# Patient Record
Sex: FEMALE | Race: WHITE | Employment: OTHER | URBAN - NONMETROPOLITAN AREA
[De-identification: names, ages, dates, MRNs, and addresses within clinical notes are randomized per-mention and may not be internally consistent; named-entity substitution may affect disease eponyms.]

---

## 2020-04-22 ENCOUNTER — APPOINTMENT (OUTPATIENT)
Dept: CT IMAGING | Age: 65
DRG: 247 | End: 2020-04-22
Payer: MEDICAID

## 2020-04-22 ENCOUNTER — HOSPITAL ENCOUNTER (INPATIENT)
Age: 65
LOS: 1 days | Discharge: HOME | DRG: 247 | End: 2020-04-23
Attending: EMERGENCY MEDICINE | Admitting: INTERNAL MEDICINE
Payer: MEDICAID

## 2020-04-22 ENCOUNTER — APPOINTMENT (OUTPATIENT)
Dept: GENERAL RADIOLOGY | Age: 65
DRG: 247 | End: 2020-04-22
Payer: MEDICAID

## 2020-04-22 PROBLEM — I24.9 ACS (ACUTE CORONARY SYNDROME) (HCC): Status: ACTIVE | Noted: 2020-04-22

## 2020-04-22 LAB
ALBUMIN SERPL-MCNC: 4.1 G/DL (ref 3.5–5.2)
ALP BLD-CCNC: 85 U/L (ref 35–104)
ALT SERPL-CCNC: 19 U/L (ref 5–33)
ANION GAP SERPL CALCULATED.3IONS-SCNC: 16 MMOL/L (ref 7–19)
APTT: 23.2 SEC (ref 26–36.2)
AST SERPL-CCNC: 13 U/L (ref 5–32)
BACTERIA: NEGATIVE /HPF
BASOPHILS ABSOLUTE: 0 K/UL (ref 0–0.2)
BASOPHILS RELATIVE PERCENT: 0.6 % (ref 0–1)
BILIRUB SERPL-MCNC: 0.7 MG/DL (ref 0.2–1.2)
BILIRUBIN URINE: NEGATIVE
BLOOD, URINE: NEGATIVE
BUN BLDV-MCNC: 19 MG/DL (ref 8–23)
CALCIUM SERPL-MCNC: 9.2 MG/DL (ref 8.8–10.2)
CHLORIDE BLD-SCNC: 99 MMOL/L (ref 98–111)
CHOLESTEROL, TOTAL: 167 MG/DL (ref 160–199)
CLARITY: CLEAR
CO2: 24 MMOL/L (ref 22–29)
COLOR: YELLOW
CREAT SERPL-MCNC: 0.8 MG/DL (ref 0.5–0.9)
EKG P AXIS: 18 DEGREES
EKG P AXIS: 27 DEGREES
EKG P-R INTERVAL: 168 MS
EKG P-R INTERVAL: 174 MS
EKG Q-T INTERVAL: 384 MS
EKG Q-T INTERVAL: 422 MS
EKG QRS DURATION: 96 MS
EKG QRS DURATION: 98 MS
EKG QTC CALCULATION (BAZETT): 422 MS
EKG QTC CALCULATION (BAZETT): 450 MS
EKG T AXIS: 44 DEGREES
EKG T AXIS: 86 DEGREES
EOSINOPHILS ABSOLUTE: 0.3 K/UL (ref 0–0.6)
EOSINOPHILS RELATIVE PERCENT: 3.7 % (ref 0–5)
EPITHELIAL CELLS, UA: 1 /HPF (ref 0–5)
GFR NON-AFRICAN AMERICAN: >60
GLUCOSE BLD-MCNC: 158 MG/DL (ref 74–109)
GLUCOSE URINE: NEGATIVE MG/DL
HCT VFR BLD CALC: 40.6 % (ref 37–47)
HDLC SERPL-MCNC: 53 MG/DL (ref 65–121)
HEMOGLOBIN: 13.5 G/DL (ref 12–16)
HYALINE CASTS: 1 /HPF (ref 0–8)
IMMATURE GRANULOCYTES #: 0 K/UL
INR BLD: 0.93 (ref 0.88–1.18)
KETONES, URINE: NEGATIVE MG/DL
LDL CHOLESTEROL CALCULATED: 85 MG/DL
LEUKOCYTE ESTERASE, URINE: ABNORMAL
LIPASE: 46 U/L (ref 13–60)
LYMPHOCYTES ABSOLUTE: 2.6 K/UL (ref 1.1–4.5)
LYMPHOCYTES RELATIVE PERCENT: 39 % (ref 20–40)
MCH RBC QN AUTO: 28.4 PG (ref 27–31)
MCHC RBC AUTO-ENTMCNC: 33.3 G/DL (ref 33–37)
MCV RBC AUTO: 85.5 FL (ref 81–99)
MONOCYTES ABSOLUTE: 0.4 K/UL (ref 0–0.9)
MONOCYTES RELATIVE PERCENT: 6.3 % (ref 0–10)
NEUTROPHILS ABSOLUTE: 3.3 K/UL (ref 1.5–7.5)
NEUTROPHILS RELATIVE PERCENT: 49.8 % (ref 50–65)
NITRITE, URINE: NEGATIVE
PDW BLD-RTO: 13.7 % (ref 11.5–14.5)
PH UA: 7.5 (ref 5–8)
PLATELET # BLD: 205 K/UL (ref 130–400)
PMV BLD AUTO: 9.6 FL (ref 9.4–12.3)
POTASSIUM REFLEX MAGNESIUM: 4 MMOL/L (ref 3.5–5)
PROTEIN UA: NEGATIVE MG/DL
PROTHROMBIN TIME: 12.3 SEC (ref 12–14.6)
RBC # BLD: 4.75 M/UL (ref 4.2–5.4)
RBC UA: 1 /HPF (ref 0–4)
SODIUM BLD-SCNC: 139 MMOL/L (ref 136–145)
SPECIFIC GRAVITY UA: 1.04 (ref 1–1.03)
TOTAL PROTEIN: 7.1 G/DL (ref 6.6–8.7)
TRIGL SERPL-MCNC: 146 MG/DL (ref 0–149)
TROPONIN: 0.05 NG/ML (ref 0–0.03)
TROPONIN: 0.1 NG/ML (ref 0–0.03)
TROPONIN: 0.1 NG/ML (ref 0–0.03)
TROPONIN: <0.01 NG/ML (ref 0–0.03)
UROBILINOGEN, URINE: 1 E.U./DL
WBC # BLD: 6.7 K/UL (ref 4.8–10.8)
WBC UA: 2 /HPF (ref 0–5)

## 2020-04-22 PROCEDURE — 4A023N7 MEASUREMENT OF CARDIAC SAMPLING AND PRESSURE, LEFT HEART, PERCUTANEOUS APPROACH: ICD-10-PCS | Performed by: INTERNAL MEDICINE

## 2020-04-22 PROCEDURE — 027034Z DILATION OF CORONARY ARTERY, ONE ARTERY WITH DRUG-ELUTING INTRALUMINAL DEVICE, PERCUTANEOUS APPROACH: ICD-10-PCS | Performed by: INTERNAL MEDICINE

## 2020-04-22 PROCEDURE — 96375 TX/PRO/DX INJ NEW DRUG ADDON: CPT

## 2020-04-22 PROCEDURE — 93005 ELECTROCARDIOGRAM TRACING: CPT | Performed by: EMERGENCY MEDICINE

## 2020-04-22 PROCEDURE — 85730 THROMBOPLASTIN TIME PARTIAL: CPT

## 2020-04-22 PROCEDURE — 81001 URINALYSIS AUTO W/SCOPE: CPT

## 2020-04-22 PROCEDURE — 99223 1ST HOSP IP/OBS HIGH 75: CPT | Performed by: INTERNAL MEDICINE

## 2020-04-22 PROCEDURE — 36415 COLL VENOUS BLD VENIPUNCTURE: CPT

## 2020-04-22 PROCEDURE — 6360000004 HC RX CONTRAST MEDICATION: Performed by: EMERGENCY MEDICINE

## 2020-04-22 PROCEDURE — 2709999900 HC NON-CHARGEABLE SUPPLY

## 2020-04-22 PROCEDURE — 6370000000 HC RX 637 (ALT 250 FOR IP): Performed by: EMERGENCY MEDICINE

## 2020-04-22 PROCEDURE — 93458 L HRT ARTERY/VENTRICLE ANGIO: CPT | Performed by: INTERNAL MEDICINE

## 2020-04-22 PROCEDURE — 85025 COMPLETE CBC W/AUTO DIFF WBC: CPT

## 2020-04-22 PROCEDURE — 96374 THER/PROPH/DIAG INJ IV PUSH: CPT

## 2020-04-22 PROCEDURE — 6360000002 HC RX W HCPCS

## 2020-04-22 PROCEDURE — 99285 EMERGENCY DEPT VISIT HI MDM: CPT

## 2020-04-22 PROCEDURE — C1894 INTRO/SHEATH, NON-LASER: HCPCS

## 2020-04-22 PROCEDURE — B2151ZZ FLUOROSCOPY OF LEFT HEART USING LOW OSMOLAR CONTRAST: ICD-10-PCS | Performed by: INTERNAL MEDICINE

## 2020-04-22 PROCEDURE — 2500000003 HC RX 250 WO HCPCS

## 2020-04-22 PROCEDURE — 80061 LIPID PANEL: CPT

## 2020-04-22 PROCEDURE — 83690 ASSAY OF LIPASE: CPT

## 2020-04-22 PROCEDURE — 2580000003 HC RX 258: Performed by: INTERNAL MEDICINE

## 2020-04-22 PROCEDURE — 6370000000 HC RX 637 (ALT 250 FOR IP)

## 2020-04-22 PROCEDURE — 92928 PRQ TCAT PLMT NTRAC ST 1 LES: CPT | Performed by: INTERNAL MEDICINE

## 2020-04-22 PROCEDURE — 71045 X-RAY EXAM CHEST 1 VIEW: CPT

## 2020-04-22 PROCEDURE — 71275 CT ANGIOGRAPHY CHEST: CPT

## 2020-04-22 PROCEDURE — C1887 CATHETER, GUIDING: HCPCS

## 2020-04-22 PROCEDURE — 6370000000 HC RX 637 (ALT 250 FOR IP): Performed by: INTERNAL MEDICINE

## 2020-04-22 PROCEDURE — 80053 COMPREHEN METABOLIC PANEL: CPT

## 2020-04-22 PROCEDURE — C1769 GUIDE WIRE: HCPCS

## 2020-04-22 PROCEDURE — G0378 HOSPITAL OBSERVATION PER HR: HCPCS

## 2020-04-22 PROCEDURE — 99152 MOD SED SAME PHYS/QHP 5/>YRS: CPT | Performed by: INTERNAL MEDICINE

## 2020-04-22 PROCEDURE — C1874 STENT, COATED/COV W/DEL SYS: HCPCS

## 2020-04-22 PROCEDURE — B2111ZZ FLUOROSCOPY OF MULTIPLE CORONARY ARTERIES USING LOW OSMOLAR CONTRAST: ICD-10-PCS | Performed by: INTERNAL MEDICINE

## 2020-04-22 PROCEDURE — 93010 ELECTROCARDIOGRAM REPORT: CPT | Performed by: INTERNAL MEDICINE

## 2020-04-22 PROCEDURE — 85610 PROTHROMBIN TIME: CPT

## 2020-04-22 PROCEDURE — C1760 CLOSURE DEV, VASC: HCPCS

## 2020-04-22 PROCEDURE — 6360000002 HC RX W HCPCS: Performed by: EMERGENCY MEDICINE

## 2020-04-22 PROCEDURE — 84484 ASSAY OF TROPONIN QUANT: CPT

## 2020-04-22 PROCEDURE — 6360000002 HC RX W HCPCS: Performed by: INTERNAL MEDICINE

## 2020-04-22 PROCEDURE — 99999 PR OFFICE/OUTPT VISIT,PROCEDURE ONLY: CPT | Performed by: EMERGENCY MEDICINE

## 2020-04-22 RX ORDER — LISINOPRIL 5 MG/1
5 TABLET ORAL DAILY
Status: DISCONTINUED | OUTPATIENT
Start: 2020-04-22 | End: 2020-04-23 | Stop reason: HOSPADM

## 2020-04-22 RX ORDER — LOVASTATIN 10 MG/1
10 TABLET ORAL NIGHTLY
Status: ON HOLD | COMMUNITY
End: 2020-04-23 | Stop reason: HOSPADM

## 2020-04-22 RX ORDER — TRAMADOL HYDROCHLORIDE 50 MG/1
50 TABLET ORAL EVERY 6 HOURS PRN
Status: DISCONTINUED | OUTPATIENT
Start: 2020-04-22 | End: 2020-04-23 | Stop reason: HOSPADM

## 2020-04-22 RX ORDER — SODIUM CHLORIDE 0.9 % (FLUSH) 0.9 %
10 SYRINGE (ML) INJECTION PRN
Status: DISCONTINUED | OUTPATIENT
Start: 2020-04-22 | End: 2020-04-23 | Stop reason: HOSPADM

## 2020-04-22 RX ORDER — ALPRAZOLAM 0.5 MG/1
0.5 TABLET ORAL 3 TIMES DAILY PRN
COMMUNITY

## 2020-04-22 RX ORDER — METOPROLOL SUCCINATE 25 MG/1
25 TABLET, EXTENDED RELEASE ORAL DAILY
Status: DISCONTINUED | OUTPATIENT
Start: 2020-04-22 | End: 2020-04-23 | Stop reason: HOSPADM

## 2020-04-22 RX ORDER — MORPHINE SULFATE 4 MG/ML
2 INJECTION, SOLUTION INTRAMUSCULAR; INTRAVENOUS ONCE
Status: COMPLETED | OUTPATIENT
Start: 2020-04-22 | End: 2020-04-22

## 2020-04-22 RX ORDER — HEPARIN SODIUM 1000 [USP'U]/ML
4000 INJECTION, SOLUTION INTRAVENOUS; SUBCUTANEOUS PRN
Status: DISCONTINUED | OUTPATIENT
Start: 2020-04-22 | End: 2020-04-22

## 2020-04-22 RX ORDER — TRIAMTERENE AND HYDROCHLOROTHIAZIDE 37.5; 25 MG/1; MG/1
1 TABLET ORAL DAILY
Status: DISCONTINUED | OUTPATIENT
Start: 2020-04-22 | End: 2020-04-23 | Stop reason: HOSPADM

## 2020-04-22 RX ORDER — TRAMADOL HYDROCHLORIDE 50 MG/1
50 TABLET ORAL EVERY 6 HOURS PRN
COMMUNITY

## 2020-04-22 RX ORDER — ACETAMINOPHEN 325 MG/1
650 TABLET ORAL EVERY 4 HOURS PRN
Status: DISCONTINUED | OUTPATIENT
Start: 2020-04-22 | End: 2020-04-23 | Stop reason: HOSPADM

## 2020-04-22 RX ORDER — HEPARIN SODIUM 10000 [USP'U]/100ML
INJECTION, SOLUTION INTRAVENOUS
Status: COMPLETED
Start: 2020-04-22 | End: 2020-04-22

## 2020-04-22 RX ORDER — SODIUM CHLORIDE 9 MG/ML
INJECTION, SOLUTION INTRAVENOUS CONTINUOUS
Status: DISCONTINUED | OUTPATIENT
Start: 2020-04-22 | End: 2020-04-23 | Stop reason: HOSPADM

## 2020-04-22 RX ORDER — HEPARIN SODIUM 1000 [USP'U]/ML
2000 INJECTION, SOLUTION INTRAVENOUS; SUBCUTANEOUS PRN
Status: DISCONTINUED | OUTPATIENT
Start: 2020-04-22 | End: 2020-04-22

## 2020-04-22 RX ORDER — SODIUM CHLORIDE 0.9 % (FLUSH) 0.9 %
10 SYRINGE (ML) INJECTION EVERY 12 HOURS SCHEDULED
Status: DISCONTINUED | OUTPATIENT
Start: 2020-04-22 | End: 2020-04-23 | Stop reason: HOSPADM

## 2020-04-22 RX ORDER — ATORVASTATIN CALCIUM 80 MG/1
80 TABLET, FILM COATED ORAL NIGHTLY
Status: DISCONTINUED | OUTPATIENT
Start: 2020-04-22 | End: 2020-04-23 | Stop reason: HOSPADM

## 2020-04-22 RX ORDER — PANTOPRAZOLE SODIUM 40 MG/1
40 TABLET, DELAYED RELEASE ORAL DAILY
Status: DISCONTINUED | OUTPATIENT
Start: 2020-04-22 | End: 2020-04-23 | Stop reason: HOSPADM

## 2020-04-22 RX ORDER — LORATADINE 10 MG/1
10 TABLET ORAL DAILY
COMMUNITY

## 2020-04-22 RX ORDER — PANTOPRAZOLE SODIUM 40 MG/1
40 TABLET, DELAYED RELEASE ORAL DAILY
COMMUNITY

## 2020-04-22 RX ORDER — TRIAMTERENE AND HYDROCHLOROTHIAZIDE 37.5; 25 MG/1; MG/1
1 TABLET ORAL DAILY
COMMUNITY
End: 2020-07-14 | Stop reason: SDUPTHER

## 2020-04-22 RX ORDER — ASPIRIN 81 MG/1
81 TABLET, CHEWABLE ORAL DAILY
Status: DISCONTINUED | OUTPATIENT
Start: 2020-04-23 | End: 2020-04-23 | Stop reason: HOSPADM

## 2020-04-22 RX ORDER — ASPIRIN 81 MG/1
81 TABLET, CHEWABLE ORAL DAILY
Status: DISCONTINUED | OUTPATIENT
Start: 2020-04-22 | End: 2020-04-22

## 2020-04-22 RX ORDER — HEPARIN SODIUM 10000 [USP'U]/100ML
1000 INJECTION, SOLUTION INTRAVENOUS CONTINUOUS
Status: DISCONTINUED | OUTPATIENT
Start: 2020-04-22 | End: 2020-04-22

## 2020-04-22 RX ORDER — HEPARIN SODIUM 1000 [USP'U]/ML
4000 INJECTION, SOLUTION INTRAVENOUS; SUBCUTANEOUS ONCE
Status: DISCONTINUED | OUTPATIENT
Start: 2020-04-22 | End: 2020-04-23 | Stop reason: HOSPADM

## 2020-04-22 RX ORDER — CITALOPRAM 40 MG/1
40 TABLET ORAL NIGHTLY
COMMUNITY

## 2020-04-22 RX ORDER — ALPRAZOLAM 0.5 MG/1
0.5 TABLET ORAL 3 TIMES DAILY PRN
Status: DISCONTINUED | OUTPATIENT
Start: 2020-04-22 | End: 2020-04-23 | Stop reason: HOSPADM

## 2020-04-22 RX ORDER — ONDANSETRON 2 MG/ML
4 INJECTION INTRAMUSCULAR; INTRAVENOUS EVERY 6 HOURS PRN
Status: DISCONTINUED | OUTPATIENT
Start: 2020-04-22 | End: 2020-04-23 | Stop reason: HOSPADM

## 2020-04-22 RX ORDER — CITALOPRAM 40 MG/1
40 TABLET ORAL DAILY
Status: DISCONTINUED | OUTPATIENT
Start: 2020-04-22 | End: 2020-04-23 | Stop reason: HOSPADM

## 2020-04-22 RX ORDER — HEPARIN SODIUM 5000 [USP'U]/ML
INJECTION, SOLUTION INTRAVENOUS; SUBCUTANEOUS
Status: COMPLETED
Start: 2020-04-22 | End: 2020-04-22

## 2020-04-22 RX ORDER — PRASUGREL 10 MG/1
10 TABLET, FILM COATED ORAL DAILY
Status: DISCONTINUED | OUTPATIENT
Start: 2020-04-23 | End: 2020-04-23 | Stop reason: HOSPADM

## 2020-04-22 RX ORDER — HEPARIN SODIUM 10000 [USP'U]/100ML
11.6 INJECTION, SOLUTION INTRAVENOUS CONTINUOUS
Status: DISCONTINUED | OUTPATIENT
Start: 2020-04-22 | End: 2020-04-22

## 2020-04-22 RX ORDER — ONDANSETRON 2 MG/ML
4 INJECTION INTRAMUSCULAR; INTRAVENOUS ONCE
Status: COMPLETED | OUTPATIENT
Start: 2020-04-22 | End: 2020-04-22

## 2020-04-22 RX ADMIN — LISINOPRIL 5 MG: 5 TABLET ORAL at 16:45

## 2020-04-22 RX ADMIN — IOPAMIDOL 95 ML: 755 INJECTION, SOLUTION INTRAVENOUS at 10:25

## 2020-04-22 RX ADMIN — ONDANSETRON 4 MG: 2 INJECTION INTRAMUSCULAR; INTRAVENOUS at 09:02

## 2020-04-22 RX ADMIN — SODIUM CHLORIDE, PRESERVATIVE FREE 10 ML: 5 INJECTION INTRAVENOUS at 21:36

## 2020-04-22 RX ADMIN — HEPARIN SODIUM AND DEXTROSE 11.6 UNITS/KG/HR: 10000; 5 INJECTION INTRAVENOUS at 12:51

## 2020-04-22 RX ADMIN — SODIUM CHLORIDE: 9 INJECTION, SOLUTION INTRAVENOUS at 15:07

## 2020-04-22 RX ADMIN — HEPARIN SODIUM 4000 UNITS: 5000 INJECTION INTRAVENOUS; SUBCUTANEOUS at 12:32

## 2020-04-22 RX ADMIN — LIDOCAINE HYDROCHLORIDE: 20 SOLUTION ORAL; TOPICAL at 09:56

## 2020-04-22 RX ADMIN — HEPARIN SODIUM 25000 UNITS: 10000 INJECTION, SOLUTION INTRAVENOUS at 12:45

## 2020-04-22 RX ADMIN — METOPROLOL SUCCINATE 25 MG: 25 TABLET, EXTENDED RELEASE ORAL at 16:45

## 2020-04-22 RX ADMIN — BIVALIRUDIN 1 MG/KG/HR: 250 INJECTION, POWDER, LYOPHILIZED, FOR SOLUTION INTRAVENOUS at 14:37

## 2020-04-22 RX ADMIN — MORPHINE SULFATE 2 MG: 4 INJECTION INTRAVENOUS at 09:02

## 2020-04-22 RX ADMIN — PANTOPRAZOLE SODIUM 40 MG: 40 TABLET, DELAYED RELEASE ORAL at 21:36

## 2020-04-22 RX ADMIN — SODIUM CHLORIDE: 9 INJECTION, SOLUTION INTRAVENOUS at 21:37

## 2020-04-22 RX ADMIN — IOPAMIDOL 161 ML: 612 INJECTION, SOLUTION INTRAVENOUS at 14:13

## 2020-04-22 RX ADMIN — CITALOPRAM HYDROBROMIDE 40 MG: 40 TABLET ORAL at 21:35

## 2020-04-22 RX ADMIN — ATORVASTATIN CALCIUM 80 MG: 80 TABLET, FILM COATED ORAL at 21:35

## 2020-04-22 ASSESSMENT — ENCOUNTER SYMPTOMS
SHORTNESS OF BREATH: 0
RHINORRHEA: 0
ABDOMINAL PAIN: 0
EYE PAIN: 0
VOMITING: 0
VOICE CHANGE: 0
COUGH: 0
DIARRHEA: 0
EYE REDNESS: 0

## 2020-04-22 ASSESSMENT — PAIN SCALES - GENERAL
PAINLEVEL_OUTOF10: 5
PAINLEVEL_OUTOF10: 6
PAINLEVEL_OUTOF10: 0
PAINLEVEL_OUTOF10: 6
PAINLEVEL_OUTOF10: 4
PAINLEVEL_OUTOF10: 0

## 2020-04-22 ASSESSMENT — PAIN DESCRIPTION - PAIN TYPE
TYPE: ACUTE PAIN
TYPE: ACUTE PAIN

## 2020-04-22 ASSESSMENT — PAIN DESCRIPTION - LOCATION
LOCATION: CHEST
LOCATION: CHEST

## 2020-04-22 ASSESSMENT — HEART SCORE: ECG: 1

## 2020-04-22 NOTE — H&P
TUBAL LIGATION           Home Medications:   Prior to Admission medications    Medication Sig Start Date End Date Taking? Authorizing Provider   citalopram (CELEXA) 40 MG tablet Take 40 mg by mouth daily   Yes Historical Provider, MD   aspirin 81 MG tablet Take 81 mg by mouth daily   Yes Historical Provider, MD   pantoprazole (PROTONIX) 40 MG tablet Take 40 mg by mouth daily   Yes Historical Provider, MD   triamterene-hydrochlorothiazide (MAXZIDE-25) 37.5-25 MG per tablet Take 1 tablet by mouth daily   Yes Historical Provider, MD   ALPRAZolam (XANAX) 0.5 MG tablet Take 0.5 mg by mouth 3 times daily as needed for Sleep. Yes Historical Provider, MD   traMADol (ULTRAM) 50 MG tablet Take 50 mg by mouth every 6 hours as needed for Pain. Yes Historical Provider, MD        Facility Administered Medications:    heparin (porcine)  4,000 Units Intravenous Once       Allergies:  Codeine     Social History:       Social History     Socioeconomic History    Marital status:      Spouse name: Not on file    Number of children: Not on file    Years of education: Not on file    Highest education level: Not on file   Occupational History    Not on file   Social Needs    Financial resource strain: Not on file    Food insecurity     Worry: Not on file     Inability: Not on file    Transportation needs     Medical: Not on file     Non-medical: Not on file   Tobacco Use    Smoking status: Former Smoker    Smokeless tobacco: Never Used    Tobacco comment: quit in the 80's   Substance and Sexual Activity    Alcohol use: Yes     Comment: occ.  1-2 beers last night    Drug use: Never    Sexual activity: Not on file   Lifestyle    Physical activity     Days per week: Not on file     Minutes per session: Not on file    Stress: Not on file   Relationships    Social connections     Talks on phone: Not on file     Gets together: Not on file     Attends Islam service: Not on file     Active member of club or organization: Not on file     Attends meetings of clubs or organizations: Not on file     Relationship status: Not on file    Intimate partner violence     Fear of current or ex partner: Not on file     Emotionally abused: Not on file     Physically abused: Not on file     Forced sexual activity: Not on file   Other Topics Concern    Not on file   Social History Narrative    Not on file       Family History:   History reviewed. No pertinent family history. REVIEW OF SYSTEMS:     Except as noted in the HPI, all other systems are negative        PHYSICAL EXAMINATION:     /83   Pulse 80   Temp 97.7 °F (36.5 °C)   Resp 11   Ht 5' 3\" (1.6 m)   Wt 190 lb (86.2 kg)   SpO2 97%   BMI 33.66 kg/m²     GENERAL - well developed and well nourished, in mild amount of generalized distress; is an active participant in this examination  HEENT -  PERRLA, Hearing appears normal, conjunctiva and lids are normal, ears and nose appear normal  NECK - no thyromegaly, no JVD, trachea is in the midline  CARDIOVASCULAR - PMI is in the left mid line clavicular position, Normal S1 and S2 with a grade 1/6 systolic murmur. No S3 or S4    PULMONARY - No respiratory distress. scattered wheezes and rales.   Breath sounds in both  lung fields are Normal  ABDOMEN  - soft, non tender, no rebound, no hepatomegaly or splenomegaly  MUSCULOSKELETAL  - Prone/Supine, digitals and nails are without clubbing or cyanosis  EXTREMITIES - trace edema  NEUROLOGIC - cranial nerves, II-XII, are normal  SKIN - turgor is normal, no rash  PSYCHIATRIC - was not normal mood and affect, alert and orientated x 3, judgement and insight appear appropriate      LABORATORY EVALUATION & TESTING:    I have personally reviewed and interpreted the results of the following diagnostic testing      EKG and or Telemetry:  which was personally reviewed me:  Sinus rhythm,   Pulse Readings from Last 1 Encounters:   04/22/20 80    bpm,  with Acute changes of reversible

## 2020-04-22 NOTE — ED NOTES
Bed: 02-A  Expected date:   Expected time:   Means of arrival:   Comments:  EMS      Cecilio Beltran RN  04/22/20 1120

## 2020-04-22 NOTE — ED NOTES
Pain started 10 mins prior to calling EMS; EMS administered total of 100 mcg of fentanyl IVP, SL nitro x1, and 324 mg ASA.       Lor Rodas RN  04/22/20 [id number]

## 2020-04-22 NOTE — ED NOTES
Dr. Ledy Cruz at bedside     [facility], [address] [place] [address]  04/22/20 9190

## 2020-04-23 VITALS
RESPIRATION RATE: 11 BRPM | OXYGEN SATURATION: 95 % | WEIGHT: 198.5 LBS | TEMPERATURE: 96.9 F | BODY MASS INDEX: 35.17 KG/M2 | HEART RATE: 69 BPM | SYSTOLIC BLOOD PRESSURE: 132 MMHG | HEIGHT: 63 IN | DIASTOLIC BLOOD PRESSURE: 93 MMHG

## 2020-04-23 LAB
EKG P AXIS: 24 DEGREES
EKG P-R INTERVAL: 192 MS
EKG Q-T INTERVAL: 468 MS
EKG QRS DURATION: 102 MS
EKG QTC CALCULATION (BAZETT): 482 MS
EKG T AXIS: 107 DEGREES
HCT VFR BLD CALC: 39.8 % (ref 37–47)
HEMOGLOBIN: 12.6 G/DL (ref 12–16)
MCH RBC QN AUTO: 27.9 PG (ref 27–31)
MCHC RBC AUTO-ENTMCNC: 31.7 G/DL (ref 33–37)
MCV RBC AUTO: 88.1 FL (ref 81–99)
PDW BLD-RTO: 14.1 % (ref 11.5–14.5)
PLATELET # BLD: 189 K/UL (ref 130–400)
PMV BLD AUTO: 9.8 FL (ref 9.4–12.3)
RBC # BLD: 4.52 M/UL (ref 4.2–5.4)
TROPONIN: 0.05 NG/ML (ref 0–0.03)
TROPONIN: 0.07 NG/ML (ref 0–0.03)
WBC # BLD: 7.8 K/UL (ref 4.8–10.8)

## 2020-04-23 PROCEDURE — 84484 ASSAY OF TROPONIN QUANT: CPT

## 2020-04-23 PROCEDURE — 85027 COMPLETE CBC AUTOMATED: CPT

## 2020-04-23 PROCEDURE — 99238 HOSP IP/OBS DSCHRG MGMT 30/<: CPT | Performed by: INTERNAL MEDICINE

## 2020-04-23 PROCEDURE — 2000000000 HC ICU R&B

## 2020-04-23 PROCEDURE — 6370000000 HC RX 637 (ALT 250 FOR IP): Performed by: INTERNAL MEDICINE

## 2020-04-23 PROCEDURE — 93005 ELECTROCARDIOGRAM TRACING: CPT | Performed by: INTERNAL MEDICINE

## 2020-04-23 PROCEDURE — 36415 COLL VENOUS BLD VENIPUNCTURE: CPT

## 2020-04-23 PROCEDURE — 2580000003 HC RX 258: Performed by: INTERNAL MEDICINE

## 2020-04-23 RX ORDER — ATORVASTATIN CALCIUM 80 MG/1
80 TABLET, FILM COATED ORAL NIGHTLY
Qty: 30 TABLET | Refills: 3 | Status: SHIPPED | OUTPATIENT
Start: 2020-04-23 | End: 2020-07-14 | Stop reason: SDUPTHER

## 2020-04-23 RX ORDER — METOPROLOL SUCCINATE 25 MG/1
25 TABLET, EXTENDED RELEASE ORAL DAILY
Qty: 30 TABLET | Refills: 3 | Status: SHIPPED | OUTPATIENT
Start: 2020-04-24 | End: 2020-05-20

## 2020-04-23 RX ORDER — LISINOPRIL 5 MG/1
5 TABLET ORAL DAILY
Qty: 30 TABLET | Refills: 3 | Status: SHIPPED | OUTPATIENT
Start: 2020-04-24 | End: 2020-05-20

## 2020-04-23 RX ORDER — CLOPIDOGREL BISULFATE 75 MG/1
75 TABLET ORAL DAILY
Qty: 30 TABLET | Refills: 3 | Status: SHIPPED | OUTPATIENT
Start: 2020-04-23 | End: 2020-07-14 | Stop reason: SDUPTHER

## 2020-04-23 RX ADMIN — ASPIRIN 81 MG 81 MG: 81 TABLET ORAL at 08:48

## 2020-04-23 RX ADMIN — METOPROLOL SUCCINATE 25 MG: 25 TABLET, EXTENDED RELEASE ORAL at 08:48

## 2020-04-23 RX ADMIN — TRIAMTERENE AND HYDROCHLOROTHIAZIDE 1 TABLET: 37.5; 25 TABLET ORAL at 08:48

## 2020-04-23 RX ADMIN — LISINOPRIL 5 MG: 5 TABLET ORAL at 08:48

## 2020-04-23 RX ADMIN — PANTOPRAZOLE SODIUM 40 MG: 40 TABLET, DELAYED RELEASE ORAL at 08:48

## 2020-04-23 RX ADMIN — SODIUM CHLORIDE: 9 INJECTION, SOLUTION INTRAVENOUS at 04:41

## 2020-04-23 RX ADMIN — SODIUM CHLORIDE, PRESERVATIVE FREE 10 ML: 5 INJECTION INTRAVENOUS at 08:49

## 2020-04-23 RX ADMIN — PRASUGREL 10 MG: 10 TABLET, FILM COATED ORAL at 08:48

## 2020-04-23 ASSESSMENT — PAIN SCALES - GENERAL
PAINLEVEL_OUTOF10: 0

## 2020-04-23 NOTE — DISCHARGE SUMMARY
[facility]    Discharge Summary          I personally saw the patient and rounded with:  Karen Washington, on  4/23/20      The observations documented in this note, including the assessment and plan are mine              Patient ID: Joann Lawson      Patient's PCP: No primary care provider on file. Admit Date: 4/22/2020     Discharge Date:  04/23/20     Admitting Physician:  Dee Lopez MD       Discharge Physician: Dee Lopez MD     Discharge Diagnoses:    1. Coronary artery disease    4/22/20 ACS RACHEL RISK Score 3 (angina, + troponin, ASA ), AUC indication 3, AUC score 8   4/22/20  Cath  95% mid LAD, 4.0 x 28 mm KISHOR, anterior lateral hypokinesis, EF 45%    2. Systemic arterial hypertension  3. Prior cigarette use      Cardiac Specific Diagnoses:    Specialty Problems        Cardiology Problems    ACS (acute coronary syndrome) [facility])                The patient was seen and examined on day of discharge and this discharge summary is in conjunction with any daily progress note from day of discharge. History of Present Illness:     Joann Lawson is a 62 y.o. female who presents to [facility] ED # 2 (admitted on 4/22/2020  8:48 AM) with symptoms / signs / problem or diagnosis of chest discomfort.      She is normal mood and affect, alert and orientated x 3, judgement and insight appear appropriate.         Family present:  No    At the beginning of the interview she was lying on a stretcher        CONTEXT OF THIS HISTORY OF PRESENT ILLNESS INCLUDE: (IF APPLICABLE):     Presentation:  She  presented with chest discomfort. The discomfort began about 8 am this am when she had just taken her dogs out (large and small mixed breeds). The discomfort progressed, was pressure like, associated with nauseated, SOA, and radiated to the left arm. She called EMS and was brought to the ED. She is pain free now.   Cardiology was asked to see her regarding these symptoms and findings and to consider those in relationship to possible optimal diagnosis and treatment options.        FEATURES OF THIS HISTORY OF PRESENT ILLNESS (SYMPTOMS AND FINDINGS) INCLUDE:         1. Location: The chest discomfort was located in the central to left chest with radiation to the back, throat and left shoulder. 2. Duration: The discomfort began about 8 am and lasted about an hour    3. Quality: The chest discomfort was not described as pressure, fullness and tightness. It was not crushing. 4. Severity: It was described as moderate but progessive   5. Timing The symptoms began after she took her dogs out. 6. Modifying Factors: Modifying features included:   none   7. Associated Signs and Symptoms: There was associated manifestations such as nausea, diaphoresis, and shortness of air. 8. Context: As noted above in the context of the presentation.     It  was quite suggestive of myocardial ischemia.         When being examined this morning, in ED, # 2 with Doni RN Nurse Navigator & Sophia   RN, there was not on going or continuous symptoms of exertional chest discomfort, unusual or change in shortness of air, presyncope or syncope. Hospital Course:     After admission, the patient underwent cardiac catheterization according to the following criteria:  4/22/20 ACS RACHEL RISK Score 3 (angina, + troponin, ASA ), AUC indication 3, AUC score 8 , Diagnostic Catheterization Appropriate Use Criteria Description, [facility] 2012;[id number]. Selective left heart and coronary arteriography was preformed, which revealed:    1. Successful femoral artery ultrasound  2. Successful femoral artery arterogram  3. Supervision of the administration of moderate conscious sedation  4.  single vessel coronary artery disease involving the mid LAD  5. Left ventricular function is impaired in the anterior lateral segment with a visually estimate ejection of 45%. 6.  95% lesion in the mid LAD  7.   Successful percutaneous coronary intervention utilizing a single drug eluding stent to the mid LAD. 8.  Systemic arterial hypertension (systolic blood pressure > 140 mmHg)  9. Elevated left ventricular end diastolic pressure (LVEDP > 15 mmHg). There were no apparent immediate complications. After the PCI, the patient's troponin peaked at 0.07 which was decreasing at the time of discharge. The rest of the patient's hospitalization was uneventful. She was discharged home on medical therapy with outpatient follow up. Consults:     IP CONSULT TO CARDIOLOGY  IP CONSULT TO CARDIAC REHAB      Significant Diagnostic Studies:    1. Selective left heart and coronary arteriography (femoral approach), 4/22/2020       Significant Therapeutic Endeavors:      1. Percutaneous coronary intervention to the mid LAD, 4/22/2020      Activity: activity as directed per discharge instructions. Diet:  Cardiac diet    Labs:  For convenience and continuity at follow-up the following most recent labs are provided:    CBC:    Lab Results   Component Value Date    WBC 7.8 04/23/2020    HGB 12.6 04/23/2020    HCT 39.8 04/23/2020     04/23/2020       Renal:    Lab Results   Component Value Date     04/22/2020    K 4.0 04/22/2020    CL 99 04/22/2020    CO2 24 04/22/2020    BUN 19 04/22/2020    CREATININE 0.8 04/22/2020    CALCIUM 9.2 04/22/2020         Discharge Medications:     Current Discharge Medication List           Details   atorvastatin (LIPITOR) 80 MG tablet Take 1 tablet by mouth nightly  Qty: 30 tablet, Refills: 3      lisinopril (PRINIVIL;ZESTRIL) 5 MG tablet Take 1 tablet by mouth daily  Qty: 30 tablet, Refills: 3      metoprolol succinate (TOPROL XL) 25 MG extended release tablet Take 1 tablet by mouth daily  Qty: 30 tablet, Refills: 3      clopidogrel (PLAVIX) 75 MG tablet Take 1 tablet by mouth daily  Qty: 30 tablet, Refills: 3              Details   citalopram (CELEXA) 40 MG tablet Take 40 mg by mouth daily

## 2020-05-20 ENCOUNTER — TELEPHONE (OUTPATIENT)
Dept: CARDIOLOGY | Age: 65
End: 2020-05-20

## 2020-05-20 ENCOUNTER — OFFICE VISIT (OUTPATIENT)
Dept: CARDIOLOGY | Age: 65
End: 2020-05-20
Payer: MEDICAID

## 2020-05-20 VITALS
BODY MASS INDEX: 33.13 KG/M2 | SYSTOLIC BLOOD PRESSURE: 124 MMHG | DIASTOLIC BLOOD PRESSURE: 70 MMHG | HEART RATE: 66 BPM | HEIGHT: 63 IN | WEIGHT: 187 LBS

## 2020-05-20 PROBLEM — R53.83 FATIGUE: Status: ACTIVE | Noted: 2020-05-20

## 2020-05-20 PROBLEM — I25.10 CORONARY ARTERY DISEASE INVOLVING NATIVE CORONARY ARTERY OF NATIVE HEART WITHOUT ANGINA PECTORIS: Status: ACTIVE | Noted: 2020-05-20

## 2020-05-20 PROBLEM — I10 ESSENTIAL HYPERTENSION: Status: ACTIVE | Noted: 2020-05-20

## 2020-05-20 PROBLEM — Z95.5 HISTORY OF CORONARY ARTERY STENT PLACEMENT: Status: ACTIVE | Noted: 2020-05-20

## 2020-05-20 PROBLEM — I25.2 HISTORY OF MYOCARDIAL INFARCTION: Status: ACTIVE | Noted: 2020-05-20

## 2020-05-20 PROBLEM — E78.2 MIXED HYPERLIPIDEMIA: Status: ACTIVE | Noted: 2020-05-20

## 2020-05-20 PROCEDURE — G8427 DOCREV CUR MEDS BY ELIG CLIN: HCPCS | Performed by: NURSE PRACTITIONER

## 2020-05-20 PROCEDURE — 1111F DSCHRG MED/CURRENT MED MERGE: CPT | Performed by: NURSE PRACTITIONER

## 2020-05-20 PROCEDURE — 3017F COLORECTAL CA SCREEN DOC REV: CPT | Performed by: NURSE PRACTITIONER

## 2020-05-20 PROCEDURE — G8417 CALC BMI ABV UP PARAM F/U: HCPCS | Performed by: NURSE PRACTITIONER

## 2020-05-20 PROCEDURE — 1036F TOBACCO NON-USER: CPT | Performed by: NURSE PRACTITIONER

## 2020-05-20 PROCEDURE — 99214 OFFICE O/P EST MOD 30 MIN: CPT | Performed by: NURSE PRACTITIONER

## 2020-05-20 RX ORDER — METOPROLOL SUCCINATE 25 MG/1
25 TABLET, EXTENDED RELEASE ORAL NIGHTLY
Qty: 30 TABLET | Refills: 3
Start: 2020-05-20 | End: 2020-10-09 | Stop reason: ALTCHOICE

## 2020-05-20 RX ORDER — NITROGLYCERIN 0.4 MG/1
0.4 TABLET SUBLINGUAL EVERY 5 MIN PRN
Qty: 25 TABLET | Refills: 3 | Status: SHIPPED | OUTPATIENT
Start: 2020-05-20

## 2020-05-20 RX ORDER — LISINOPRIL 5 MG/1
2.5 TABLET ORAL DAILY
Qty: 30 TABLET | Refills: 0
Start: 2020-05-20 | End: 2020-10-09 | Stop reason: ALTCHOICE

## 2020-05-20 NOTE — PATIENT INSTRUCTIONS
Information box to learn more about \"Learning About Coronary Artery Disease (CAD). \"     If you do not have an account, please click on the \"Sign Up Now\" link. Current as of: December 15, 2019Content Version: 12.4  © 7306-4171 [employer]. Care instructions adapted under license by [facility] ([facility]). If you have questions about a medical condition or this instruction, always ask your healthcare professional. [address] any warranty or liability for your use of this information. Patient Education        A Healthy Heart: Care Instructions  Your Care Instructions    Heart disease occurs when a substance called plaque builds up in the vessels that supply oxygen-rich blood to your heart. This can narrow the blood vessels and reduce blood flow. A heart attack happens when blood flow is completely blocked. A high-fat diet, smoking, and other factors increase the risk of heart disease. Your doctor has found that you have a chance of having heart disease. You can do lots of things to keep your heart healthy. It may not be easy, but you can change your diet, exercise more, and quit smoking. These steps really work to lower your chance of heart disease. Follow-up care is a key part of your treatment and safety. Be sure to make and go to all appointments, and call your doctor if you are having problems. It's also a good idea to know your test results and keep a list of the medicines you take. How can you care for yourself at home? Diet    · Use less salt when you cook and eat. This helps lower your blood pressure. Taste food before salting. Add only a little salt when you think you need it. With time, your taste buds will adjust to less salt.     · Eat fewer snack items, fast foods, canned soups, and other high-salt, high-fat, processed foods.     · Read food labels and try to avoid saturated and trans fats.  They increase your risk of heart disease by raising cholesterol levels.     · Limit the amount of solid fat-butter, margarine, and shortening-you eat. Use olive, peanut, or canola oil when you cook. Bake, broil, and steam foods instead of frying them.     · Eat a variety of fruit and vegetables every day. Dark green, deep orange, red, or yellow fruits and vegetables are especially good for you. Examples include spinach, carrots, peaches, and berries.     · Foods high in fiber can reduce your cholesterol and provide important vitamins and minerals. High-fiber foods include whole-grain cereals and breads, oatmeal, beans, brown rice, citrus fruits, and apples.     · Eat lean proteins. Heart-healthy proteins include seafood, lean meats and poultry, eggs, beans, peas, nuts, seeds, and soy products.     · Limit drinks and foods with added sugar. These include candy, desserts, and soda pop.    Lifestyle changes    · If your doctor recommends it, get more exercise. Walking is a good choice. Bit by bit, increase the amount you walk every day. Try for at least 30 minutes on most days of the week. You also may want to swim, bike, or do other activities.     · Do not smoke. If you need help quitting, talk to your doctor about stop-smoking programs and medicines. These can increase your chances of quitting for good. Quitting smoking may be the most important step you can take to protect your heart. It is never too late to quit. You will get health benefits right away.     · Limit alcohol to 2 drinks a day for men and 1 drink a day for women. Too much alcohol can cause health problems. Medicines    · Take your medicines exactly as prescribed. Call your doctor if you think you are having a problem with your medicine.     · If your doctor recommends aspirin, take the amount directed each day. Make sure you take aspirin and not another kind of pain reliever, such as acetaminophen (Tylenol).  If you take ibuprofen (such as Advil or Motrin) for other problems, take aspirin at least 2 hours before taking

## 2020-05-20 NOTE — PROGRESS NOTES
artery disease involving native coronary artery of native heart without angina pectoris I25.10     Past Medical History:   Diagnosis Date    Anxiety     CAD (coronary artery disease)     Depression     GERD (gastroesophageal reflux disease)     Hypertension     Psoriasis     PUD (peptic ulcer disease)      Past Surgical History:   Procedure Laterality Date    CARDIAC CATHETERIZATION      90's    CHOLECYSTECTOMY, OPEN      CORONARY ANGIOPLASTY WITH STENT PLACEMENT      TUBAL LIGATION       Family History   Problem Relation Age of Onset    Cancer Father         lung    Cancer Brother         oral     Social History     Tobacco Use    Smoking status: Former Smoker    Smokeless tobacco: Never Used    Tobacco comment: quit in the 80's   Substance Use Topics    Alcohol use: Yes     Comment: occ. 1-2 beers last night      Current Outpatient Medications   Medication Sig Dispense Refill    atorvastatin (LIPITOR) 80 MG tablet Take 1 tablet by mouth nightly 30 tablet 3    lisinopril (PRINIVIL;ZESTRIL) 5 MG tablet Take 1 tablet by mouth daily 30 tablet 3    metoprolol succinate (TOPROL XL) 25 MG extended release tablet Take 1 tablet by mouth daily 30 tablet 3    clopidogrel (PLAVIX) 75 MG tablet Take 1 tablet by mouth daily 30 tablet 3    citalopram (CELEXA) 40 MG tablet Take 40 mg by mouth nightly       aspirin 81 MG tablet Take 81 mg by mouth daily      pantoprazole (PROTONIX) 40 MG tablet Take 40 mg by mouth daily      triamterene-hydrochlorothiazide (MAXZIDE-25) 37.5-25 MG per tablet Take 1 tablet by mouth daily      ALPRAZolam (XANAX) 0.5 MG tablet Take 0.5 mg by mouth 3 times daily as needed for Sleep.  traMADol (ULTRAM) 50 MG tablet Take 50 mg by mouth every 6 hours as needed for Pain.  loratadine (CLARITIN) 10 MG tablet Take 10 mg by mouth daily       No current facility-administered medications for this visit.         Allergies: Codeine    Review of Systems  Constitutional - no

## 2020-07-14 ENCOUNTER — OFFICE VISIT (OUTPATIENT)
Dept: CARDIOLOGY | Age: 65
End: 2020-07-14
Payer: MEDICAID

## 2020-07-14 VITALS
BODY MASS INDEX: 33.66 KG/M2 | HEIGHT: 63 IN | HEART RATE: 84 BPM | WEIGHT: 190 LBS | SYSTOLIC BLOOD PRESSURE: 118 MMHG | DIASTOLIC BLOOD PRESSURE: 80 MMHG

## 2020-07-14 PROCEDURE — G8427 DOCREV CUR MEDS BY ELIG CLIN: HCPCS | Performed by: INTERNAL MEDICINE

## 2020-07-14 PROCEDURE — 3017F COLORECTAL CA SCREEN DOC REV: CPT | Performed by: INTERNAL MEDICINE

## 2020-07-14 PROCEDURE — G8417 CALC BMI ABV UP PARAM F/U: HCPCS | Performed by: INTERNAL MEDICINE

## 2020-07-14 PROCEDURE — 1036F TOBACCO NON-USER: CPT | Performed by: INTERNAL MEDICINE

## 2020-07-14 PROCEDURE — 99213 OFFICE O/P EST LOW 20 MIN: CPT | Performed by: INTERNAL MEDICINE

## 2020-07-14 RX ORDER — TRIAMTERENE AND HYDROCHLOROTHIAZIDE 37.5; 25 MG/1; MG/1
1 TABLET ORAL DAILY
Qty: 30 TABLET | Refills: 6 | Status: SHIPPED | OUTPATIENT
Start: 2020-07-14 | End: 2020-10-09 | Stop reason: ALTCHOICE

## 2020-07-14 RX ORDER — ATORVASTATIN CALCIUM 80 MG/1
80 TABLET, FILM COATED ORAL NIGHTLY
Qty: 30 TABLET | Refills: 6 | Status: SHIPPED | OUTPATIENT
Start: 2020-07-14 | End: 2021-03-04 | Stop reason: SDUPTHER

## 2020-07-14 RX ORDER — LISINOPRIL 5 MG/1
2.5 TABLET ORAL DAILY
Qty: 30 TABLET | Refills: 6 | Status: CANCELLED | OUTPATIENT
Start: 2020-07-14

## 2020-07-14 RX ORDER — LISINOPRIL 2.5 MG/1
1.25 TABLET ORAL DAILY
Qty: 90 TABLET | Refills: 5 | Status: SHIPPED | OUTPATIENT
Start: 2020-07-14 | End: 2021-03-04 | Stop reason: SDUPTHER

## 2020-07-14 RX ORDER — LISINOPRIL 2.5 MG/1
1.25 TABLET ORAL DAILY
COMMUNITY
End: 2020-07-14 | Stop reason: SDUPTHER

## 2020-07-14 RX ORDER — CLOPIDOGREL BISULFATE 75 MG/1
75 TABLET ORAL DAILY
Qty: 30 TABLET | Refills: 6 | Status: SHIPPED | OUTPATIENT
Start: 2020-07-14 | End: 2021-03-04 | Stop reason: SDUPTHER

## 2020-07-14 RX ORDER — METOPROLOL SUCCINATE 25 MG/1
12.5 TABLET, EXTENDED RELEASE ORAL 2 TIMES DAILY
COMMUNITY
End: 2020-07-14 | Stop reason: SDUPTHER

## 2020-07-14 RX ORDER — METOPROLOL SUCCINATE 25 MG/1
25 TABLET, EXTENDED RELEASE ORAL NIGHTLY
Qty: 30 TABLET | Refills: 6 | Status: CANCELLED | OUTPATIENT
Start: 2020-07-14

## 2020-07-14 RX ORDER — METOPROLOL SUCCINATE 25 MG/1
12.5 TABLET, EXTENDED RELEASE ORAL DAILY
Qty: 30 TABLET | Refills: 5 | Status: SHIPPED | OUTPATIENT
Start: 2020-07-14 | End: 2021-03-04 | Stop reason: SDUPTHER

## 2020-07-14 NOTE — PROGRESS NOTES
[facility] Patient Office Visit    [id number]  Phone: [phone]  Fax: [phone]        7/14/2020    Chief Complaint / Reason for the Visit   Follow up of:  CAD and HTN and Hyperlipidemia      Specialty Problems        Cardiology Problems    ACS (acute coronary syndrome) [facility])        Coronary artery disease involving native coronary artery of native heart without angina pectoris        Essential hypertension        Mixed hyperlipidemia              Current Status Today According to the patient:  \"I get tired and wore out trying to do anything\"    Subjective:  Ms. Aurora Valle is generally feeling stable. Ms. Aurora Valle has the following cardiac complaints / symptoms today:    1. CAD, Is stable on current medications     2. HTN, Is stable on current medications     3.  Hyperlipidemia, managed by Dr. Kamryn Desai is a 62 y.o. female [facility] the [facility]:    Patient Active Problem List    Diagnosis Date Noted    History of myocardial infarction 05/20/2020    History of coronary artery stent placement 05/20/2020    Mixed hyperlipidemia 05/20/2020    Essential hypertension 05/20/2020    Coronary artery disease involving native coronary artery of native heart without angina pectoris 05/20/2020    Fatigue 05/20/2020    ACS (acute coronary syndrome) ([facility] [address] 04/22/2020     Current Outpatient Medications   Medication Sig Dispense Refill    lisinopril (PRINIVIL;ZESTRIL) 5 MG tablet Take 0.5 tablets by mouth daily 30 tablet 0    metoprolol succinate (TOPROL XL) 25 MG extended release tablet Take 1 tablet by mouth nightly 30 tablet 3    nitroGLYCERIN (NITROSTAT) 0.4 MG SL tablet Place 1 tablet under the tongue every 5 minutes as needed for Chest pain 25 tablet 3    atorvastatin (LIPITOR) 80 MG tablet Take 1 tablet by mouth nightly 30 tablet 3    clopidogrel (PLAVIX) 75 MG tablet Take 1 tablet by mouth daily 30 tablet 3    citalopram (CELEXA) 40 MG tablet Take 40 mg by mouth nightly       aspirin 81 MG tablet Take 81 mg by mouth daily      pantoprazole (PROTONIX) 40 MG tablet Take 40 mg by mouth daily      triamterene-hydrochlorothiazide (MAXZIDE-25) 37.5-25 MG per tablet Take 1 tablet by mouth daily      ALPRAZolam (XANAX) 0.5 MG tablet Take 0.5 mg by mouth 3 times daily as needed for Sleep.  traMADol (ULTRAM) 50 MG tablet Take 50 mg by mouth every 6 hours as needed for Pain.  loratadine (CLARITIN) 10 MG tablet Take 10 mg by mouth daily       No current facility-administered medications for this visit. Allergies: Codeine  Past Medical History:   Diagnosis Date    Anxiety     CAD (coronary artery disease)     Depression     GERD (gastroesophageal reflux disease)     Hypertension     Psoriasis     PUD (peptic ulcer disease)      Past Surgical History:   Procedure Laterality Date    CARDIAC CATHETERIZATION      90's    CHOLECYSTECTOMY, OPEN      CORONARY ANGIOPLASTY WITH STENT PLACEMENT      TUBAL LIGATION       Family History   Problem Relation Age of Onset    Cancer Father         lung    Cancer Brother         oral     Social History     Tobacco Use    Smoking status: Former Smoker    Smokeless tobacco: Never Used    Tobacco comment: quit in the 80's   Substance Use Topics    Alcohol use: Yes     Comment: occ.  1-2 beers last night          Review of Systems:    General:      Complaint / Symptom Yes / No / Description if Yes       Fatigue No   Weight gain N/A   Insomnia N/A       Respiratory:        Complaint / Symptom Yes / No / Description if Yes       Cough No   Horseness N/A       Cardiovascular:    Complaint / Symptom Yes / No / Description if Yes       Chest Pain No   Shortness of Air / Orthopnea No   Presyncope / Syncope No   Palpitations No         Objective:    /80   Pulse 84   Ht 5' 3\" (1.6 m)   Wt 190 lb (86.2 kg)   BMI 33.66 kg/m²     GENERAL - well developed and well nourished, conversant  HEENT -  PERRLA, Hearing appears normal  NECK - no thyromegaly, no JVD, trachea is in the midline  CARDIOVASCULAR - PMI is in the mid line clavicular position, Normal S1 and S2 with a grade 1/6 systolic murmur. No S3 or S4    PULMONARY - no respiratory distress. No wheezes or rales. Lungs are clear to ausculation   ABDOMEN  - soft, non tender, no rebound  MUSCULOSKELETAL  - range of motion of the upper and lower extermites appears normal and equal and is without pain   EXTREMITIES - no significant edema   NEUROLOGIC - gait and station are normal  SKIN - turgor is normal  PSYCHIATRIC - normal mood and affect, alert and orientated x 3,      ASSESSMENT:    ALL THE CARDIOLOGY PROBLEMS ARE LISTED ABOVE; HOWEVER, THE FOLLOWING SPECIFIC CARDIAC PROBLEMS / CONDITIONS WERE ADDRESSED AND TREATED DURING THE OFFICE VISIT TODAY:                                                                                            MEDICAL DECISION MAKING             Cardiac Specific Problem / Diagnosis  Discussion and Data Reviewed Diagnostic Procedures Ordered Management Options Selected           1. CAD  show no change   Review and summation of old records:    4/22/20 ACS RACHEL RISK Score 3 (angina, + troponin, ASA ), AUC indication 3, AUC score 8   4/22/20  Cath  95% mid LAD, 4.0 x 28 mm KISHOR, anterior lateral hypokinesis, EF 45%    Will decrease Toprol to 12.5 mg twice and Lisinopril to 1.25 mg daily No Continue current medications:     Yes           2. HTN  show no change   Your last three blood pressures readings in our office have been:    BP Readings from Last 3 Encounters:   07/14/20 118/80   05/20/20 124/70   04/23/20 (!) 132/93     No Continue current medications:    {Yes           3. Hyperlipidemia  show no change   Managed by Dr. Orestes Muse No Continue current medications:       Yes         Discussed with patient.     Follow Up Visit Scheduled for:  3

## 2020-10-01 ENCOUNTER — TELEPHONE (OUTPATIENT)
Dept: CARDIOLOGY | Age: 65
End: 2020-10-01

## 2020-10-01 NOTE — TELEPHONE ENCOUNTER
Date: TBD    Cardiologist: CHRISTIAN    Procedure: routine dental care, scaling, root planing, and extractions    Surgeon: Margot Antony    Last Office Visit: 7/14/20  Reason for office visit and medical concerns addressed at this office visit: cad, htn, hyperlipidemia    Testing Performed and Date of Service:  4/22/20  Cath  95% mid LAD, 4.0 x 28 mm KISHOR, anterior lateral hypokinesis, EF 45%    RCRI = 0.9   METs 4    Current Medications: lipitor, plavix, maxzide, toprol, lisinopril, celexa, aspirin, protonix, tramadol,     Is the patient currently taking an anticoagulant? If so, what is the diagnosis the patient has been given to warrant the need for the anticoagulant? plavix for KISHOR on 4/22/20    Additional Notes: wanting to know if any special considerations need to be taken when treating patient for routine care, scaling, root planing or extractions or with the use of numbing medications.

## 2020-10-09 ENCOUNTER — OFFICE VISIT (OUTPATIENT)
Dept: CARDIOLOGY | Age: 65
End: 2020-10-09
Payer: MEDICAID

## 2020-10-09 VITALS
WEIGHT: 193 LBS | SYSTOLIC BLOOD PRESSURE: 132 MMHG | BODY MASS INDEX: 32.95 KG/M2 | DIASTOLIC BLOOD PRESSURE: 94 MMHG | HEART RATE: 70 BPM | HEIGHT: 64 IN

## 2020-10-09 PROCEDURE — 99213 OFFICE O/P EST LOW 20 MIN: CPT | Performed by: INTERNAL MEDICINE

## 2020-10-09 RX ORDER — HYDROCHLOROTHIAZIDE 25 MG/1
25 TABLET ORAL DAILY
COMMUNITY

## 2020-10-09 NOTE — PROGRESS NOTES
[address] [facility] Patient Office Visit    Eric RamirezmoThelma dunham 18 [address]  Phone: [phone]  Fax: [phone]        10/9/2020    Chief Complaint / Reason for the Visit   Follow up of:  CAD and HTN and Hyperlipidemia      Specialty Problems        Cardiology Problems    ACS (acute coronary syndrome) [facility])        Coronary artery disease involving native coronary artery of native heart without angina pectoris        Essential hypertension        Mixed hyperlipidemia              Current Status Today According to the patient:  \"I get tired and wore out trying to do anything\"    Subjective:  Ms. Talia Joy is generally feeling stable. Ms. Talia Joy has the following cardiac complaints / symptoms today:    1. CAD, Is stable on current medications     2. HTN, Is stable on current medications     3.  Hyperlipidemia, managed by Dr. David Nieves is a 62 y.o. female with the following history as recorded in EpicCare:    Patient Active Problem List    Diagnosis Date Noted    History of myocardial infarction 05/20/2020    History of coronary artery stent placement 05/20/2020    Mixed hyperlipidemia 05/20/2020    Essential hypertension 05/20/2020    Coronary artery disease involving native coronary artery of native heart without angina pectoris 05/20/2020    Fatigue 05/20/2020    ACS (acute coronary syndrome) ([facility] 04/22/2020     Current Outpatient Medications   Medication Sig Dispense Refill    hydroCHLOROthiazide (HYDRODIURIL) 25 MG tablet Take 25 mg by mouth daily      atorvastatin (LIPITOR) 80 MG tablet Take 1 tablet by mouth nightly 30 tablet 6    clopidogrel (PLAVIX) 75 MG tablet Take 1 tablet by mouth daily 30 tablet 6    metoprolol succinate (TOPROL XL) 25 MG extended release tablet Take 0.5 tablets by mouth daily 30 tablet 5    lisinopril (PRINIVIL;ZESTRIL) 2.5 MG tablet Take 0.5 tablets by mouth daily 90 tablet GENERAL - well developed and well nourished, conversant  HEENT -  PERRLA, Hearing appears normal  NECK - no thyromegaly, no JVD, trachea is in the midline  CARDIOVASCULAR - PMI is in the mid line clavicular position, Normal S1 and S2 with a grade 1/6 systolic murmur. No S3 or S4    PULMONARY - no respiratory distress. No wheezes or rales. Lungs are clear to ausculation   ABDOMEN  - soft, non tender, no rebound  MUSCULOSKELETAL  - range of motion of the upper and lower extermites appears normal and equal and is without pain   EXTREMITIES - no significant edema   NEUROLOGIC - gait and station are normal  SKIN - turgor is normal  PSYCHIATRIC - normal mood and affect, alert and orientated x 3,      Assessment and Plan     CAD -stable cardiovascular standpoint without any angina on exertion she is worried about her LV function because she was told during the intervention, I reviewed her record in the system there was no echo ordered after the intervention she was told her ejection fraction was about 40%, change 2D echo to assess LV function and my office will follow up with results  HTN -controlled on current medication, continue the same  HLD -Lipitor, will continue the same medication follow-up with primary care      Discussed with patient. Follow Up Visit Scheduled for:  6 month(s)    I greatly appreciate the opportunity to meet Lina Zimmerman and your confidence in allowing me to participate in her cardiovascular care.     [facility] Angie Tobin MD, [facility]  Interventional Cardiologist, Endovascular Specialist   Medical Director, Structural Heart Program   RUSS/ Celeste [address]    10/9/20 10:55 AM

## 2020-10-16 ENCOUNTER — HOSPITAL ENCOUNTER (OUTPATIENT)
Dept: NON INVASIVE DIAGNOSTICS | Age: 65
Discharge: HOME | End: 2020-10-16
Payer: MEDICAID

## 2020-10-16 LAB
LV EF: 55 %
LVEF MODALITY: NORMAL

## 2020-10-16 PROCEDURE — 93306 TTE W/DOPPLER COMPLETE: CPT

## 2021-03-04 DIAGNOSIS — I25.10 CORONARY ARTERY DISEASE INVOLVING NATIVE CORONARY ARTERY OF NATIVE HEART WITHOUT ANGINA PECTORIS: ICD-10-CM

## 2021-03-04 DIAGNOSIS — I10 ESSENTIAL HYPERTENSION: ICD-10-CM

## 2021-03-04 DIAGNOSIS — E78.2 MIXED HYPERLIPIDEMIA: ICD-10-CM

## 2021-03-04 RX ORDER — METOPROLOL SUCCINATE 25 MG/1
12.5 TABLET, EXTENDED RELEASE ORAL DAILY
Qty: 45 TABLET | Refills: 1 | Status: SHIPPED | OUTPATIENT
Start: 2021-03-04 | End: 2021-04-12 | Stop reason: SDUPTHER

## 2021-03-04 RX ORDER — CLOPIDOGREL BISULFATE 75 MG/1
75 TABLET ORAL DAILY
Qty: 90 TABLET | Refills: 1 | Status: SHIPPED | OUTPATIENT
Start: 2021-03-04 | End: 2021-04-12 | Stop reason: SDUPTHER

## 2021-03-04 RX ORDER — LISINOPRIL 2.5 MG/1
1.25 TABLET ORAL DAILY
Qty: 45 TABLET | Refills: 1 | Status: SHIPPED | OUTPATIENT
Start: 2021-03-04 | End: 2021-04-12 | Stop reason: SDUPTHER

## 2021-03-04 RX ORDER — ATORVASTATIN CALCIUM 80 MG/1
80 TABLET, FILM COATED ORAL NIGHTLY
Qty: 30 TABLET | Refills: 0 | Status: SHIPPED | OUTPATIENT
Start: 2021-03-04 | End: 2021-04-12 | Stop reason: SDUPTHER

## 2021-04-12 ENCOUNTER — OFFICE VISIT (OUTPATIENT)
Dept: CARDIOLOGY CLINIC | Age: 66
End: 2021-04-12
Payer: MEDICAID

## 2021-04-12 VITALS
BODY MASS INDEX: 33.63 KG/M2 | HEIGHT: 64 IN | DIASTOLIC BLOOD PRESSURE: 98 MMHG | WEIGHT: 197 LBS | SYSTOLIC BLOOD PRESSURE: 144 MMHG | HEART RATE: 78 BPM

## 2021-04-12 DIAGNOSIS — I25.10 CORONARY ARTERY DISEASE INVOLVING NATIVE CORONARY ARTERY OF NATIVE HEART WITHOUT ANGINA PECTORIS: ICD-10-CM

## 2021-04-12 DIAGNOSIS — R68.89 DECREASED EXERCISE TOLERANCE: ICD-10-CM

## 2021-04-12 DIAGNOSIS — I25.10 CORONARY ARTERY DISEASE INVOLVING NATIVE CORONARY ARTERY OF NATIVE HEART, ANGINA PRESENCE UNSPECIFIED: Primary | ICD-10-CM

## 2021-04-12 DIAGNOSIS — R07.9 CHEST PAIN, UNSPECIFIED TYPE: ICD-10-CM

## 2021-04-12 DIAGNOSIS — I25.2 HISTORY OF MYOCARDIAL INFARCTION: ICD-10-CM

## 2021-04-12 DIAGNOSIS — Z95.5 HISTORY OF CORONARY ARTERY STENT PLACEMENT: ICD-10-CM

## 2021-04-12 DIAGNOSIS — I10 ESSENTIAL HYPERTENSION: ICD-10-CM

## 2021-04-12 DIAGNOSIS — E78.2 MIXED HYPERLIPIDEMIA: ICD-10-CM

## 2021-04-12 DIAGNOSIS — R61 DIAPHORESIS: ICD-10-CM

## 2021-04-12 PROCEDURE — 99214 OFFICE O/P EST MOD 30 MIN: CPT | Performed by: NURSE PRACTITIONER

## 2021-04-12 PROCEDURE — 1036F TOBACCO NON-USER: CPT | Performed by: NURSE PRACTITIONER

## 2021-04-12 PROCEDURE — G8427 DOCREV CUR MEDS BY ELIG CLIN: HCPCS | Performed by: NURSE PRACTITIONER

## 2021-04-12 PROCEDURE — G8417 CALC BMI ABV UP PARAM F/U: HCPCS | Performed by: NURSE PRACTITIONER

## 2021-04-12 PROCEDURE — 3017F COLORECTAL CA SCREEN DOC REV: CPT | Performed by: NURSE PRACTITIONER

## 2021-04-12 RX ORDER — LISINOPRIL 2.5 MG/1
1.25 TABLET ORAL DAILY
Qty: 15 TABLET | Refills: 5 | Status: SHIPPED | OUTPATIENT
Start: 2021-04-12 | End: 2021-08-10 | Stop reason: SDUPTHER

## 2021-04-12 RX ORDER — METOPROLOL SUCCINATE 25 MG/1
12.5 TABLET, EXTENDED RELEASE ORAL DAILY
Qty: 15 TABLET | Refills: 5 | Status: SHIPPED | OUTPATIENT
Start: 2021-04-12 | End: 2021-08-10 | Stop reason: SDUPTHER

## 2021-04-12 RX ORDER — CLOPIDOGREL BISULFATE 75 MG/1
75 TABLET ORAL DAILY
Qty: 30 TABLET | Refills: 5 | Status: SHIPPED | OUTPATIENT
Start: 2021-04-12 | End: 2021-08-10 | Stop reason: SDUPTHER

## 2021-04-12 RX ORDER — ATORVASTATIN CALCIUM 80 MG/1
80 TABLET, FILM COATED ORAL NIGHTLY
Qty: 30 TABLET | Refills: 5 | Status: SHIPPED | OUTPATIENT
Start: 2021-04-12 | End: 2021-05-04

## 2021-04-12 RX ORDER — ISOSORBIDE MONONITRATE 30 MG/1
30 TABLET, EXTENDED RELEASE ORAL NIGHTLY
Qty: 30 TABLET | Refills: 5 | Status: SHIPPED | OUTPATIENT
Start: 2021-04-12 | End: 2021-08-10 | Stop reason: SDUPTHER

## 2021-04-12 RX ORDER — ATORVASTATIN CALCIUM 80 MG/1
TABLET, FILM COATED ORAL
Qty: 30 TABLET | Refills: 0 | Status: SHIPPED | OUTPATIENT
Start: 2021-04-12 | End: 2021-08-10 | Stop reason: SDUPTHER

## 2021-04-12 NOTE — PROGRESS NOTES
[facility] [place], [place]. [address], [address], [address] [facility]  [phone] office  [phone] fax      OFFICE VISIT:  4/12/2021    [address] [facility],[address]: 1955  Reason For Visit:  Joan Mueller is a 60 y.o. female who is here for 6 Month Follow-Up (patient has fatigue ) and Coronary Artery Disease    History:   Diagnosis Orders   1. Coronary artery disease involving native coronary artery of native heart, angina presence unspecified  NM MYOCARDIAL SPECT REST EXERCISE OR RX   2. History of coronary artery stent placement     3. History of myocardial infarction     4. Essential hypertension  metoprolol succinate (TOPROL XL) 25 MG extended release tablet    lisinopril (PRINIVIL;ZESTRIL) 2.5 MG tablet    clopidogrel (PLAVIX) 75 MG tablet    atorvastatin (LIPITOR) 80 MG tablet   5. Mixed hyperlipidemia  metoprolol succinate (TOPROL XL) 25 MG extended release tablet    lisinopril (PRINIVIL;ZESTRIL) 2.5 MG tablet    clopidogrel (PLAVIX) 75 MG tablet    atorvastatin (LIPITOR) 80 MG tablet   6. Chest pain, unspecified type  NM MYOCARDIAL SPECT REST EXERCISE OR RX   7. Diaphoresis     8. Decreased exercise tolerance       The patient presents today for cardiology follow up. She is s/p NSTEMI with KISHOR to mid LAD by Dr. Othel Closs on 4/22/20. She is not a smoker and reports completing cardiac rehab. The patient states \"I have some discomfort from time to time in left chest with I exert. I also will hurt in my left shoulder blade sometime too like before my heart attack. I just have never gotten by stamina back. I get so diaphoretic and short of breath when I exert myself. My legs will get weak too. Seems like I have had all this since my heart attack. \"   The patient has not sensed arrhythmia or had issues with fluid retention. The patient reports BP meds were reduced some. BP is elevated today. The patient's PCP monitors cholesterol. Subjective  Jona Mueller denies  orthopnea, paroxysmal nocturnal dyspnea, syncope, presyncope, sensed arrhythmia and edema. The patient denies numbness or weakness to suggest cerebrovascular accident or transient ischemic attack. + exertional left chest and scapula pain. Associated exertional weakness and diaphoresis. Chris Copeland has the following history as recorded in [facility]:  Patient Active Problem List   Diagnosis Code    [facility] I[facility]    History of myocardial infarction I25.2    History of coronary artery stent placement Z95.5    Mixed hyperlipidemia E78.2    Essential hypertension I10    Coronary artery disease involving native coronary artery of native heart without angina pectoris I25.10    Fatigue R53.83     Past Medical History:   Diagnosis Date    Anxiety     CAD (coronary artery disease)     Depression     GERD (gastroesophageal reflux disease)     Hypertension     Psoriasis     PUD (peptic ulcer disease)      Past Surgical History:   Procedure Laterality Date    CARDIAC CATHETERIZATION      90's    CHOLECYSTECTOMY, OPEN      CORONARY ANGIOPLASTY WITH STENT PLACEMENT      TUBAL LIGATION       Family History   Problem Relation Age of Onset    Cancer Father         lung    Cancer Brother         oral     Social History     Tobacco Use    Smoking status: Former Smoker    Smokeless tobacco: Never Used    Tobacco comment: quit in the 80's   Substance Use Topics    Alcohol use: Yes     Comment: occ.  1-2 beers last night      Current Outpatient Medications   Medication Sig Dispense Refill    clopidogrel (PLAVIX) 75 MG tablet Take 1 tablet by mouth daily 90 tablet 1    lisinopril (PRINIVIL;ZESTRIL) 2.5 MG tablet Take 0.5 tablets by mouth daily 45 tablet 1    metoprolol succinate (TOPROL XL) 25 MG extended release tablet Take 0.5 tablets by mouth daily 45 tablet 1    atorvastatin (LIPITOR) 80 MG tablet Take 1 tablet by mouth nightly 30 tablet 0 lateralizing weakness. No seizures, presyncope or syncope. No significant dizziness. Hematologic - no easy bruising or excessive bleeding. Psychiatric - no severe anxiety or insomnia. No confusion. All other review of systems are negative. Objective  Vital Signs - /82   Pulse 78   Ht 5' 4\" (1.626 m)   Wt 197 lb (89.4 kg)   BMI 33.81 kg/m²   General - Placido Hayes is alert, cooperative, and pleasant. Well groomed. No acute distress. Body habitus - Body mass index is 33.81 kg/m². HEENT - Head is normocephalic. No circumoral cyanosis. Dentition is normal.  EYES -   Lids normal without ptosis. No discharge, edema or subconjunctival hemorrhage. Neck - Symmetrical without apparent mass or lymphadenopathy. Respiratory - Normal respiratory effort without use of accessory muscles. Ausculatation reveals vesicular breath sounds without crackles, wheezes, rub or rhonchi. Cardiovascular - No jugular venous distention. Auscultation reveals regular rate and rhythm. No audible clicks, gallop or rub. No murmur. No lower extremity varicosities. No carotid bruits. Abdominal -  No visible distention, mass or pulsations. Extremities - No clubbing or cyanosis. No statis dermatitis or ulcers. No edema. Musculoskeletal -   No Osler's nodes. No kyphosis or scoliosis. Gait is even and regular without limp or shuffle. Ambulates without assistance. Skin -  Warm and dry; no rash or pallor. No new surgical wound. Neurological - No focal neurological deficits. Thought processes coherent. No apparent tremor. Oriented to person, place and time. Psychiatric -  Appropriate affect and mood.      Data reviewed:   Coronary artery disease   4/22/20 ACS RACHEL RISK Score 3 (angina, + troponin, ASA ), AUC indication 3, AUC score 8   4/22/20  Cath  95% mid LAD, 4.0 x 28 mm KISHOR, anterior lateral hypokinesis, EF 45%    10/16/20 echo  Normal left ventricular size and systolic function EF 32%   Mild up as scheduled in [address] appointments. Educational included in patient instructions. Heart health.       TOMAS Mart

## 2021-04-12 NOTE — PATIENT INSTRUCTIONS
New instructions for today:  Start Imdur (isosoribe) 30 mg (1) tab at bedtime to increase blood supply to your heart. Monitor your blood pressure twice daily and keep a log. Your blood pressure goal is 130/80 or less. We will discuss in 2 weeks by either scheduled telephone encounter or patient call back. Office phone number [phone]. Lexiscan Nuclear Stress Test   Date/Time:Tuesday 20th 11:00    [facility] located on the first floor of [address] [place] [address] through hospital main entrance and turn immediately to your left. Test Description:  There are two parts to a Lexiscan stress test: the rest portion and the exercise portion. For the rest portion, a radioactive tracer is injected into your arm through the IV. After 30 to 60 minutes, the process of imaging will begin. A nuclear camera will be placed on your chest area and images are taken for the next 15 to 20 minutes. For the exercise portion, a nurse will attach EKG electrodes to your chest to monitor your heart rate. The drug [place] is administered to simulate stress on the heart. Your heart rhythm will then be monitored for the next few minutes. Your blood pressure will also be monitored throughout the exercise portion. [place] through the exercise portion, a second round of radioactive tracer is injected into your body. Your heart rate and EKG will be monitored for another few minutes after administering the drug. Test Preparation:     Bring a list of your current medications. Do not take any of your medications the morning of the test, but bring all morning medications with you as you will take them after the stress portion of the test is completed.  No caffeine 12 hours prior to the testing. This includes: coffee, pop/soda, chocolate, cold medications, etc.  Any product that might contain caffeine.      No nicotine or alcohol 12 hours prior to your test.  Nothing to eat or drink 4-6 hours prior to appointment time. It is okay to drink small amounts of water during the four hours prior to the test.   Nitroglycerin patches must be taken off 1 hour before testing.  Wear comfortable clothing.  Please refrain from any strenuous exercise or activities the day before your test, or the day of your test.   The Nuclear Lexiscan Stress test takes about 2 ½ to 3 hours to complete. If for any reason you are unable to keep this appointment, please contact Outpatient Scheduling, [phone], as soon as possible to reschedule. Patient Instructions:  Continue current medications as prescribed. Always keep a current medication list. Bring your medications to every office visit. Continue to follow up with primary care provider for non cardiac medical problems. Call the office with any problems, questions or concerns at [phone]. If you have been asked to keep a blood pressure log, do so for 2 weeks. Call the office to report readings to the triage nurse at [phone]. Follow up with cardiologist as scheduled. The following educational material has been included in this after visit summary for your review: Life simple 7. Heart health. Life simple 7  1) Manage blood pressure - high blood pressure is a major risk factor for heart disease and stroke. Keeping blood pressure in health range reduces strain on your heart, arteries and kidneys. Blood pressure goal is less than 130/80. 2) Control cholesterol - contributes to plaque, which can clog arteries and lead to heart disease and stroke. When you control your cholesterol you are giving your arteries their best chance to remain clear. It is recommended that you get cholesterol lab work done once a year. 3) Reduce blood sugar - most of the food we eat is turning into glucose or blood sugar that our body uses for energy.   Over time, high levels of blood sugar can damage your heart, kidneys, eyes and nerves. 4) Get active - living an active life is one of the most rewarding gifts you can give yourself and those you love. Simply put, daily physical activity increases your length and quality of life. Strive to exercise 15 minutes most days of the week. 5)  Eat better - A healthy diet is one of your best weapons for fighting cardiovascular disease. When you eat a heart healthy diet, you improve your chances for feeling good and staying healthy for life. 6)  Lose weight - when you shed extra fat an unnecessary pounds, you reduce the burden on your hear, lungs, blood vessels and skeleton. You give yourself the gift of active living, you lower your blood pressure and help yourself feel better. 7) Stop smoking - cigarette smokers have a higher risk of developing cardiovascular disease. If  You smoke, quitting is the best thing you can do for your health. Check American Heart Association on line for more information on Life's Simple 7 and tips for healthy living. A Healthy Heart: Care Instructions  Your Care Instructions     Coronary artery disease, also called heart disease, occurs when a substance called plaque builds up in the vessels that supply oxygen-rich blood to your heart muscle. This can narrow the blood vessels and reduce blood flow. A heart attack happens when blood flow is completely blocked. A high-fat diet, smoking, and other factors increase the risk of heart disease. Your doctor has found that you have a chance of having heart disease. You can do lots of things to keep your heart healthy. It may not be easy, but you can change your diet, exercise more, and quit smoking. These steps really work to lower your chance of heart disease. Follow-up care is a key part of your treatment and safety. Be sure to make and go to all appointments, and call your doctor if you are having problems. It's also a good idea to know your test results and keep a list of the medicines you take.   How can you care for yourself at home? Diet  · Use less salt when you cook and eat. This helps lower your blood pressure. Taste food before salting. Add only a little salt when you think you need it. With time, your taste buds will adjust to less salt. · Eat fewer snack items, fast foods, canned soups, and other high-salt, high-fat, processed foods. · Read food labels and try to avoid saturated and trans fats. They increase your risk of heart disease by raising cholesterol levels. · Limit the amount of solid fat-butter, margarine, and shortening-you eat. Use olive, peanut, or canola oil when you cook. Bake, broil, and steam foods instead of frying them. · Eat a variety of fruit and vegetables every day. Dark green, deep orange, red, or yellow fruits and vegetables are especially good for you. Examples include spinach, carrots, peaches, and berries. · Foods high in fiber can reduce your cholesterol and provide important vitamins and minerals. High-fiber foods include whole-grain cereals and breads, oatmeal, beans, brown rice, citrus fruits, and apples. · Eat lean proteins. Heart-healthy proteins include seafood, lean meats and poultry, eggs, beans, peas, nuts, seeds, and soy products. · Limit drinks and foods with added sugar. These include candy, desserts, and soda pop. Lifestyle changes  · If your doctor recommends it, get more exercise. Walking is a good choice. Bit by bit, increase the amount you walk every day. Try for at least 30 minutes on most days of the week. You also may want to swim, bike, or do other activities. · Do not smoke. If you need help quitting, talk to your doctor about stop-smoking programs and medicines. These can increase your chances of quitting for good. Quitting smoking may be the most important step you can take to protect your heart. It is never too late to quit. · Limit alcohol to 2 drinks a day for men and 1 drink a day for women. Too much alcohol can cause health problems.   · Manage other health problems such as diabetes, high blood pressure, and high cholesterol. If you think you may have a problem with alcohol or drug use, talk to your doctor. Medicines  · Take your medicines exactly as prescribed. Call your doctor if you think you are having a problem with your medicine. · If your doctor recommends aspirin, take the amount directed each day. Make sure you take aspirin and not another kind of pain reliever, such as acetaminophen (Tylenol). When should you call for help? [id number] if you have symptoms of a heart attack. These may include:  · Chest pain or pressure, or a strange feeling in the chest.  · Sweating. · Shortness of breath. · Pain, pressure, or a strange feeling in the back, neck, jaw, or upper belly or in one or both shoulders or arms. · Lightheadedness or sudden weakness. · A fast or irregular heartbeat. After you call 911, the  may tell you to chew 1 adult-strength or 2 to 4 low-dose aspirin. Wait for an ambulance. Do not try to drive yourself. Watch closely for changes in your health, and be sure to contact your doctor if you have any problems. Where can you learn more? Go to https://[employer].DanceOn. org and sign in to your [employer] account. Enter N540 in the [facility] box to learn more about \"A Healthy Heart: Care Instructions. \"     If you do not have an account, please click on the \"Sign Up Now\" link. Current as of: December 16, 2019               Content Version: 12.5  © 3154-4551 Healthwise, Incorporated. Care instructions adapted under license by [facility] ([facility]). If you have questions about a medical condition or this instruction, always ask your healthcare professional. [place] [address] any warranty or liability for your use of this information. How to take:  NITROGLYCERIN (Nitrostat) 0.4 mg tablets, sublingual.  Nitroglycerin is in a group of drugs called nitrates.  Nitroglycerin dilates (widens) blood vessels, making it easier for blood to flow through them and easier for the heart to pump. Dosing Guidelines for Nitroglycerin Tablets  · At the start of an angina (chest pain) attack, place one tablet under the tongue or between the cheek and gum. Do not swallow or chew the tablet; let it dissolve on its own. If necessary, a second and third tablet may be used, with five minutes between using each tablet. If you use a third tablet and your chest pain continues, it is time to seek immediate medical attention. Call 911 immediately and have someone drive you to the emergency room. You may be having a heart attack or other serious heart problem. · To prevent angina from exercise or stress, use 1 tablet 5 to 10 minutes before the activity.

## 2021-04-20 ENCOUNTER — HOSPITAL ENCOUNTER (OUTPATIENT)
Dept: NUCLEAR MEDICINE | Age: 66
Discharge: HOME | End: 2021-04-22
Payer: MEDICAID

## 2021-04-20 DIAGNOSIS — R07.9 CHEST PAIN, UNSPECIFIED TYPE: ICD-10-CM

## 2021-04-20 DIAGNOSIS — I25.10 CORONARY ARTERY DISEASE INVOLVING NATIVE CORONARY ARTERY OF NATIVE HEART, ANGINA PRESENCE UNSPECIFIED: ICD-10-CM

## 2021-04-20 PROCEDURE — 78452 HT MUSCLE IMAGE SPECT MULT: CPT

## 2021-04-20 PROCEDURE — 6360000002 HC RX W HCPCS: Performed by: NURSE PRACTITIONER

## 2021-04-20 PROCEDURE — 3430000000 HC RX DIAGNOSTIC RADIOPHARMACEUTICAL: Performed by: NURSE PRACTITIONER

## 2021-04-20 PROCEDURE — A9500 TC99M SESTAMIBI: HCPCS | Performed by: NURSE PRACTITIONER

## 2021-04-20 RX ADMIN — TETRAKIS(2-METHOXYISOBUTYLISOCYANIDE)COPPER(I) TETRAFLUOROBORATE 30 MILLICURIE: 1 INJECTION, POWDER, LYOPHILIZED, FOR SOLUTION INTRAVENOUS at 13:42

## 2021-04-20 RX ADMIN — REGADENOSON 0.4 MG: 0.08 INJECTION, SOLUTION INTRAVENOUS at 13:00

## 2021-04-20 RX ADMIN — TETRAKIS(2-METHOXYISOBUTYLISOCYANIDE)COPPER(I) TETRAFLUOROBORATE 10 MILLICURIE: 1 INJECTION, POWDER, LYOPHILIZED, FOR SOLUTION INTRAVENOUS at 13:42

## 2021-04-23 LAB
LV EF: 59 %
LVEF MODALITY: NORMAL

## 2021-05-04 ENCOUNTER — VIRTUAL VISIT (OUTPATIENT)
Dept: CARDIOLOGY CLINIC | Age: 66
End: 2021-05-04
Payer: MEDICAID

## 2021-05-04 DIAGNOSIS — I10 ESSENTIAL HYPERTENSION: ICD-10-CM

## 2021-05-04 DIAGNOSIS — I25.10 CORONARY ARTERY DISEASE INVOLVING NATIVE CORONARY ARTERY OF NATIVE HEART WITHOUT ANGINA PECTORIS: Primary | ICD-10-CM

## 2021-05-04 DIAGNOSIS — Z95.5 HISTORY OF CORONARY ARTERY STENT PLACEMENT: ICD-10-CM

## 2021-05-04 DIAGNOSIS — E78.2 MIXED HYPERLIPIDEMIA: ICD-10-CM

## 2021-05-04 PROCEDURE — 1036F TOBACCO NON-USER: CPT | Performed by: NURSE PRACTITIONER

## 2021-05-04 PROCEDURE — 99214 OFFICE O/P EST MOD 30 MIN: CPT | Performed by: NURSE PRACTITIONER

## 2021-05-04 PROCEDURE — G8417 CALC BMI ABV UP PARAM F/U: HCPCS | Performed by: NURSE PRACTITIONER

## 2021-05-04 PROCEDURE — G8427 DOCREV CUR MEDS BY ELIG CLIN: HCPCS | Performed by: NURSE PRACTITIONER

## 2021-05-04 PROCEDURE — 3017F COLORECTAL CA SCREEN DOC REV: CPT | Performed by: NURSE PRACTITIONER

## 2021-05-04 NOTE — PROGRESS NOTES
Vivien Azevedo is a 60 y.o. female evaluated via telephone on 5/4/2021. Consent:  She and/or health care decision maker is aware that that she may receive a bill for this telephone service, depending on her insurance coverage, and has provided verbal consent to proceed: Yes    Documentation:  I communicated with the patient and/or health care decision maker about CAD. Details of this discussion including any medical advice provided: Heart health. I affirm this is a Patient Initiated Episode with an Established Patient who has not had a related appointment within my department in the past 7 days or scheduled within the next 24 hours. Total Time: minutes: 5-10 minutes    Note: not billable if this call serves to triage the patient into an appointment for the relevant concern    Anju Prince      5/4/2021    Audio Patient Encouter(During KHEPX-46 public health emergency)  The telephone encourter was conducted with patient in their residence from [address] with TOMAS Love; assistance by Sherry Ford MA.    HPI:  Rajendra Gamez   Diagnosis Orders   1. Coronary artery disease involving native coronary artery of native heart without angina pectoris     2. Essential hypertension     3. Mixed hyperlipidemia     4. History of coronary artery stent placement       The patient presents today for follow up after 4/12/21 office visit where Imdur was added to CAD medical management. She is s/p NSTEMI with KISHOR to mid LAD by Dr. Marnie Guevara on 4/22/20. She is not a smoker and reports completing cardiac rehab. The patient stated at last visit \"I have some discomfort from time to time in left chest when I exert. I also will hurt in my left shoulder blade sometime too like before my heart attack. I just have never gotten by stamina back. I get so diaphoretic and short of breath when I exert myself. My legs will get weak too. Seems like I have had all this since my heart attack. \"  The patient reports feeling much better since adding Imdur. The diaphoretic episodes and angina have resolved. She overall is feeling much better. The patient reports \"If I really over do it, I may feel just a little pull in my chest.\"  She still reports some fatigue but much better. The patient did complete cardiac rehab at last year after PCI. The patient denies symptoms to suggest myocardial ischemia, heart failure or arrhythmia. BP is well controlled on current regimen. The patient's PCP monitors cholesterol. SUBJECTIVE:  Sayda Wilson denies exertional chest pain, shortness of breath, orthopnea, paroxysmal nocturnal dyspnea, syncope, presyncope, sensed arrhythmia and edema. The patient denies numbness or weakness to suggest cerebrovascular accident or transient ischemic attack. + fatigue. Review of Systems    Prior to Visit Medications    Medication Sig Taking?  Authorizing Provider   atorvastatin (LIPITOR) 80 MG tablet TAKE ONE TABLET BY MOUTH EVERY NIGHT AT BEDTIME Yes TOMAS Olivas   metoprolol succinate (TOPROL XL) 25 MG extended release tablet Take 0.5 tablets by mouth daily Yes TOMAS Olivas   lisinopril (PRINIVIL;ZESTRIL) 2.5 MG tablet Take 0.5 tablets by mouth daily Yes TOMAS Olivas   clopidogrel (PLAVIX) 75 MG tablet Take 1 tablet by mouth daily Yes TOMAS Olivas   isosorbide mononitrate (IMDUR) 30 MG extended release tablet Take 1 tablet by mouth nightly Yes TOMAS Olivas   hydroCHLOROthiazide (HYDRODIURIL) 25 MG tablet Take 25 mg by mouth daily Yes Historical Provider, MD   nitroGLYCERIN (NITROSTAT) 0.4 MG SL tablet Place 1 tablet under the tongue every 5 minutes as needed for Chest pain Yes TOMAS Olivas   citalopram (CELEXA) 40 MG tablet Take 40 mg by mouth nightly  Yes Historical Provider, MD   aspirin 81 MG tablet Take 81 mg by mouth daily Yes Historical Provider, MD   pantoprazole (PROTONIX) 40 MG tablet Take 40 mg by mouth daily Yes Historical Provider, MD   ALPRAZolam (XANAX) 0.5 MG tablet Take 0.5 mg by mouth 3 times daily as needed for Sleep. Yes Historical Provider, MD   traMADol (ULTRAM) 50 MG tablet Take 50 mg by mouth every 6 hours as needed for Pain. Yes Historical Provider, MD   loratadine (CLARITIN) 10 MG tablet Take 10 mg by mouth daily Yes Historical Provider, MD       Social History     Tobacco Use    Smoking status: Former Smoker    Smokeless tobacco: Never Used    Tobacco comment: quit in the 80's   Substance Use Topics    Alcohol use: Yes     Comment: occ. 1-2 beers last night    Drug use: Never        REVIEW OF SYSTEMS:  Constitutional  no appetite change, or unexpected weight change. No fever, chills or diaphoresis. + mild fatigue. HEENT  no significant rhinorrhea or epistaxis. No tinnitus or significant hearing loss. Eyes  no sudden vision change or amaurosis. No corneal arcus, xantholasma, subconjunctival hemorrhage or discharge. Respiratory  no significant wheezing, stridor, apnea or cough. No dyspnea on exertion or shortness of air. Cardiovascular  no exertional chest pain to suggest myocardial ischemia. No orthopnea or PND. No sensation of sustained arrythmia. No occurrence of slow heart rate. No palpitations. No claudication. Gastrointestinal  no abdominal swelling or pain. No blood in stool. No severe constipation, diarrhea, nausea, or vomiting. Genitourinary  no dysuria, frequency, or urgency. No flank pain or hematuria. Musculoskeletal  no back pain or myalgia. No problems with gait. Extremities - no clubbing, cyanosis or extremity edema. Skin  no color change or rash. No pallor. No new surgical incision. Neurologic  no speech difficulty, facial asymmetry or lateralizing weakness. No seizures, presyncope or syncope. No significant dizziness. Hematologic  no easy bruising or excessive bleeding. Psychiatric  no severe anxiety or insomnia. No confusion.    All other review of systems are Lisinopril, Plavix, Lipitor and ASA. Recent Lexiscan negative for ischemia. Addition of Imdur relieved diaphoretic episodes and chest pain. HTN - normotensive on current regimen. Hyperlipidemia - LDL 85 on Lipitor 80 mg daily. Followed by PCP. Continue same. Patient compliant with medication regimen. Continue current medical management for cardiac condition. Continue current medications as prescribed. BP goal is 130/80 or less. If your primary care provider is outside of the [facility], please request your labs be faxed to this office at [phone]. Continue to follow up with primary care provider for non cardiac medical problems. Call the office with any problems, questions or concerns at [phone]. Follow up with cardiologist as scheduled in [address] [place] [address] Rd. Dr. Barrett Christiansen in 3 months. The following educational material has been included in this after visit summary for patient review:  Heart health. Yash Robbins is a 60 y.o. female being evaluated by a telephone encounter to address concerns as mentioned above. A caregiver was present when appropriate. Due to this being a TeleHealth encounter (During BNTVR-28 public health emergency). Pursuant to the emergency declaration under the [facility], [facility] authority and the [employer] Act, this Virtual Visit was conducted with patient's (and/or legal guardian's) consent, to reduce the patient's risk of exposure to COVID-19 and provide necessary medical care. The patient (and/or legal guardian) has also been advised to contact this office for worsening conditions or problems, and seek emergency medical treatment and/or call 911 if deemed necessary. Services were provided through a telephone discussion virtually to substitute for in-person clinic visit. Patient and provider were located at their individual homes.     --Yulia Fong, APRN on 5/4/2021 at 11:08 AM    An electronic signature was used to authenticate this note.

## 2021-05-04 NOTE — PATIENT INSTRUCTIONS
New instructions for today:  How to take:  NITROGLYCERIN (Nitrostat) 0.4 mg tablets, sublingual.  Nitroglycerin is in a group of drugs called nitrates. Nitroglycerin dilates (widens) blood vessels, making it easier for blood to flow through them and easier for the heart to pump. Dosing Guidelines for Nitroglycerin Tablets  · At the start of an angina (chest pain) attack, place one tablet under the tongue or between the cheek and gum. Do not swallow or chew the tablet; let it dissolve on its own. If necessary, a second and third tablet may be used, with five minutes between using each tablet. If you use a third tablet and your chest pain continues, it is time to seek immediate medical attention. Call 911 immediately and have someone drive you to the emergency room. You may be having a heart attack or other serious heart problem. · To prevent angina from exercise or stress, use 1 tablet 5 to 10 minutes before the activity. Patient Instructions:  Continue current medications as prescribed. Always keep a current medication list. Bring your medications to every office visit. Continue to follow up with primary care provider for non cardiac medical problems. Call the office with any problems, questions or concerns at [phone]. If you have been asked to keep a blood pressure log, do so for 2 weeks. Call the office to report readings to the triage nurse at [phone]. Follow up with cardiologist as scheduled. The following educational material has been included in this after visit summary for your review: Life simple 7. Heart health. Life simple 7  1) Manage blood pressure - high blood pressure is a major risk factor for heart disease and stroke. Keeping blood pressure in health range reduces strain on your heart, arteries and kidneys. Blood pressure goal is less than 130/80. 2) Control cholesterol - contributes to plaque, which can clog arteries and lead to heart disease and stroke.  When you control your cholesterol you are giving your arteries their best chance to remain clear. It is recommended that you get cholesterol lab work done once a year. 3) Reduce blood sugar - most of the food we eat is turning into glucose or blood sugar that our body uses for energy. Over time, high levels of blood sugar can damage your heart, kidneys, eyes and nerves. 4) Get active - living an active life is one of the most rewarding gifts you can give yourself and those you love. Simply put, daily physical activity increases your length and quality of life. Strive to exercise 15 minutes most days of the week. 5)  Eat better - A healthy diet is one of your best weapons for fighting cardiovascular disease. When you eat a heart healthy diet, you improve your chances for feeling good and staying healthy for life. 6)  Lose weight - when you shed extra fat an unnecessary pounds, you reduce the burden on your hear, lungs, blood vessels and skeleton. You give yourself the gift of active living, you lower your blood pressure and help yourself feel better. 7) Stop smoking - cigarette smokers have a higher risk of developing cardiovascular disease. If  You smoke, quitting is the best thing you can do for your health. Check American Heart Association on line for more information on Life's Simple 7 and tips for healthy living. A Healthy Heart: Care Instructions  Your Care Instructions     Coronary artery disease, also called heart disease, occurs when a substance called plaque builds up in the vessels that supply oxygen-rich blood to your heart muscle. This can narrow the blood vessels and reduce blood flow. A heart attack happens when blood flow is completely blocked. A high-fat diet, smoking, and other factors increase the risk of heart disease. Your doctor has found that you have a chance of having heart disease. You can do lots of things to keep your heart healthy.  It may not be easy, but you can change your questions about a medical condition or this instruction, always ask your healthcare professional. [address] any warranty or liability for your use of this information. Patient Education        Learning About Coronary Artery Disease (CAD)  What is coronary artery disease? Coronary artery disease is a condition that occurs when plaque builds up in the arteries that bring oxygen-rich blood to your heart. Plaque is a fatty substance made of cholesterol, calcium, and other substances in the blood. This process is called hardening of the arteries, or atherosclerosis. What happens when you have coronary artery disease? · Plaque may narrow the coronary arteries. Narrowed arteries cause poor blood flow. This can lead to angina symptoms such as chest pain or discomfort. If blood flow is completely blocked, you could have a heart attack. · You can slow and reduce the risk of future problems by making changes in your lifestyle. These include quitting smoking and eating heart-healthy foods. · Treatment, along with changes in your lifestyle, can help you live a longer and healthier life. How can you prevent coronary artery disease? · Do not smoke. It may be the best thing you can do to prevent coronary artery disease. If you need help quitting, talk to your doctor about stop-smoking programs and medicines. These can increase your chances of quitting for good. · Be active. Try to do moderate activity at least 2½ hours a week. Or try to do vigorous activity at least 1¼ hours a week. You may want to walk or try other activities, such as running, swimming, cycling, or playing tennis or team sports. · Eat heart-healthy foods. Eat more fruits and vegetables and less food that contains saturated and trans fats. Limit alcohol, sodium, and sweets. · Stay at a healthy weight. Lose weight if you need to.   · Manage other health problems such as diabetes, high blood pressure, and high cholesterol. How is coronary artery disease treated? · Your doctor will suggest that you make lifestyle changes. For example, your doctor may ask you to eat healthy foods, quit smoking, lose extra weight, and be more active. · You will take medicines that help prevent a heart attack. · Your doctor may suggest a procedure to open narrowed or blocked arteries. This is called angioplasty. Or your doctor may suggest using healthy blood vessels to create detours around narrowed or blocked arteries. This is called bypass surgery. Follow-up care is a key part of your treatment and safety. Be sure to make and go to all appointments, and call your doctor if you are having problems. It's also a good idea to know your test results and keep a list of the medicines you take. Where can you learn more? Go to https://[employer]. org and sign in to your [employer] account. Enter ([phone] [phone] in the [employer] box to learn more about \"Learning About Coronary Artery Disease (CAD). \"     If you do not have an account, please click on the \"Sign Up Now\" link. Current as of: August 31, 2020               Content Version: 12.8  © 2104-4450 Healthwise, [employer]orporated. Care instructions adapted under license by [facility] ([facility]). If you have questions about a medical condition or this instruction, always ask your healthcare professional. [address] any warranty or liability for your use of this information.

## 2021-07-27 ENCOUNTER — TELEPHONE (OUTPATIENT)
Dept: CARDIOLOGY CLINIC | Age: 66
End: 2021-07-27

## 2021-07-27 NOTE — TELEPHONE ENCOUNTER
08/10/21 Dr Jamie Vaughan over booked 08/10/21 need to r/s with [facility] same day or close to that day

## 2021-08-10 ENCOUNTER — OFFICE VISIT (OUTPATIENT)
Dept: CARDIOLOGY CLINIC | Age: 66
End: 2021-08-10
Payer: MEDICAID

## 2021-08-10 VITALS
DIASTOLIC BLOOD PRESSURE: 82 MMHG | BODY MASS INDEX: 32.95 KG/M2 | HEIGHT: 64 IN | WEIGHT: 193 LBS | SYSTOLIC BLOOD PRESSURE: 118 MMHG | HEART RATE: 81 BPM

## 2021-08-10 DIAGNOSIS — I10 ESSENTIAL HYPERTENSION: ICD-10-CM

## 2021-08-10 DIAGNOSIS — E78.2 MIXED HYPERLIPIDEMIA: ICD-10-CM

## 2021-08-10 DIAGNOSIS — I25.10 CORONARY ARTERY DISEASE INVOLVING NATIVE CORONARY ARTERY OF NATIVE HEART WITHOUT ANGINA PECTORIS: ICD-10-CM

## 2021-08-10 PROCEDURE — 3017F COLORECTAL CA SCREEN DOC REV: CPT | Performed by: NURSE PRACTITIONER

## 2021-08-10 PROCEDURE — 99213 OFFICE O/P EST LOW 20 MIN: CPT | Performed by: NURSE PRACTITIONER

## 2021-08-10 PROCEDURE — G8417 CALC BMI ABV UP PARAM F/U: HCPCS | Performed by: NURSE PRACTITIONER

## 2021-08-10 PROCEDURE — 1036F TOBACCO NON-USER: CPT | Performed by: NURSE PRACTITIONER

## 2021-08-10 PROCEDURE — G8427 DOCREV CUR MEDS BY ELIG CLIN: HCPCS | Performed by: NURSE PRACTITIONER

## 2021-08-10 RX ORDER — METOPROLOL SUCCINATE 25 MG/1
12.5 TABLET, EXTENDED RELEASE ORAL DAILY
Qty: 15 TABLET | Refills: 5 | Status: SHIPPED | OUTPATIENT
Start: 2021-08-10 | End: 2022-02-15

## 2021-08-10 RX ORDER — ATORVASTATIN CALCIUM 80 MG/1
TABLET, FILM COATED ORAL
Qty: 30 TABLET | Refills: 0 | Status: SHIPPED | OUTPATIENT
Start: 2021-08-10 | End: 2022-02-15 | Stop reason: SDUPTHER

## 2021-08-10 RX ORDER — ISOSORBIDE MONONITRATE 30 MG/1
30 TABLET, EXTENDED RELEASE ORAL NIGHTLY
Qty: 30 TABLET | Refills: 5 | Status: SHIPPED | OUTPATIENT
Start: 2021-08-10 | End: 2022-02-15 | Stop reason: SDUPTHER

## 2021-08-10 RX ORDER — CLOPIDOGREL BISULFATE 75 MG/1
75 TABLET ORAL DAILY
Qty: 30 TABLET | Refills: 5 | Status: SHIPPED | OUTPATIENT
Start: 2021-08-10 | End: 2022-02-15 | Stop reason: SDUPTHER

## 2021-08-10 RX ORDER — LISINOPRIL 2.5 MG/1
1.25 TABLET ORAL DAILY
Qty: 15 TABLET | Refills: 5 | Status: SHIPPED | OUTPATIENT
Start: 2021-08-10 | End: 2022-02-15

## 2021-08-10 ASSESSMENT — ENCOUNTER SYMPTOMS
WHEEZING: 0
NAUSEA: 0
SHORTNESS OF BREATH: 0
FACIAL SWELLING: 0
ABDOMINAL PAIN: 0
TROUBLE SWALLOWING: 0
COLOR CHANGE: 0
EYE REDNESS: 0
COUGH: 0
VOMITING: 0
EYE DISCHARGE: 0
ABDOMINAL DISTENTION: 0
BLOOD IN STOOL: 0

## 2021-08-10 NOTE — PATIENT INSTRUCTIONS
No orders of the defined types were placed in this encounter. Return in about 6 months (around 2/10/2022). Call with any questionsor concerns  Follow up with Umair Mcadams MD for non cardiac problems  Report any new problems  Cardiovascular Fitness-Exercise as tolerated. Strive for 15 minutes of exercise most days of the week. Cardiac / HealthyDiet  Continue current medications as directed  Continue plan of treatment  It is always recommended that you bring your medicationsbottles with you to each visit - this is for your safety!

## 2021-08-10 NOTE — PROGRESS NOTES
[address] [place] [address] Cardiology  [address]  [address]  Phone: [phone]  Fax: [phone]    OFFICE VISIT:  8/10/2021    [address] [facility],[address]: 1955    Reason For Visit:  Silvia Costa is a 60 y.o. female who is here for Follow-up (no complaints)      HPI    Ms. Dominick Dorantes is a 60 y.o. female with history of coronary artery disease, hypertension, hyperlipidemia, former nicotine dependence who presents with the chief complaint of 3 month follow-up. Patient states since previous appointment she has been doing well. She reports no cardiac symptoms. Silvia Costa has no exertional chest pain, pressure, burning, tightness or squeezing. No symptomatic tachy- or anali-arrhythmia. No lightheadedness, dizziness, or syncope. No numbness or weakness to suggest cerebrovascular accident or transient ischemic attack. she denies signs of bleeding. Reports no edema or shortness of breath. She denies orthopnea or paroxysmal nocturnal dyspnea. she is sleeping on 1 pillow at night. she has been compliant with her medications. her BP has been controlled at home. she reports no activity change. PCP follows lipids and labs. Dr. Kathy Shi is patient's cardiologist.       Dragan Mejia MD is PCP.   Conor Dee has the following history as recorded in [facility]:    Patient Active Problem List    Diagnosis Date Noted    History of myocardial infarction 05/20/2020    History of coronary artery stent placement 05/20/2020    Mixed hyperlipidemia 05/20/2020    Essential hypertension 05/20/2020    Coronary artery disease involving native coronary artery of native heart without angina pectoris 05/20/2020    Fatigue 05/20/2020    ACS (acute coronary syndrome) ([facility] [address] 04/22/2020     Past Medical History:   Diagnosis Date    Anxiety     CAD (coronary artery disease)     Depression     GERD (gastroesophageal reflux disease)     Hypertension     Psoriasis     PUD (peptic ulcer disease)      Past Surgical History:   Procedure Laterality Date    CARDIAC CATHETERIZATION      80's    CHOLECYSTECTOMY, OPEN      CORONARY ANGIOPLASTY WITH STENT PLACEMENT      TUBAL LIGATION       Family History   Problem Relation Age of Onset    Cancer Father         lung    Cancer Brother         oral     Social History     Tobacco Use    Smoking status: Former Smoker    Smokeless tobacco: Never Used    Tobacco comment: quit in the 80's   Substance Use Topics    Alcohol use: Yes     Comment: occ. 1-2 beers last night      Current Outpatient Medications   Medication Sig Dispense Refill    atorvastatin (LIPITOR) 80 MG tablet TAKE ONE TABLET BY MOUTH EVERY NIGHT AT BEDTIME 30 tablet 0    metoprolol succinate (TOPROL XL) 25 MG extended release tablet Take 0.5 tablets by mouth daily 15 tablet 5    lisinopril (PRINIVIL;ZESTRIL) 2.5 MG tablet Take 0.5 tablets by mouth daily 15 tablet 5    clopidogrel (PLAVIX) 75 MG tablet Take 1 tablet by mouth daily 30 tablet 5    isosorbide mononitrate (IMDUR) 30 MG extended release tablet Take 1 tablet by mouth nightly 30 tablet 5    hydroCHLOROthiazide (HYDRODIURIL) 25 MG tablet Take 25 mg by mouth daily      nitroGLYCERIN (NITROSTAT) 0.4 MG SL tablet Place 1 tablet under the tongue every 5 minutes as needed for Chest pain 25 tablet 3    citalopram (CELEXA) 40 MG tablet Take 40 mg by mouth nightly       aspirin 81 MG tablet Take 81 mg by mouth daily      pantoprazole (PROTONIX) 40 MG tablet Take 40 mg by mouth daily      ALPRAZolam (XANAX) 0.5 MG tablet Take 0.5 mg by mouth 3 times daily as needed for Sleep.  traMADol (ULTRAM) 50 MG tablet Take 50 mg by mouth every 6 hours as needed for Pain.  loratadine (CLARITIN) 10 MG tablet Take 10 mg by mouth daily       No current facility-administered medications for this visit.      Allergies: Codeine    Review of Systems  Review of Systems   Constitutional: Negative for activity change, diaphoresis, fatigue, fever and unexpected weight change. HENT: Negative for facial swelling, nosebleeds and trouble swallowing. Eyes: Negative for discharge, redness and visual disturbance. Respiratory: Negative for cough, shortness of breath and wheezing. Cardiovascular: Negative for chest pain, palpitations and leg swelling. Gastrointestinal: Negative for abdominal distention, abdominal pain, blood in stool, nausea and vomiting. Endocrine: Negative for cold intolerance and heat intolerance. Genitourinary: Negative for dysuria and hematuria. Musculoskeletal: Negative for gait problem. Skin: Negative for color change, pallor and rash. Neurological: Negative for dizziness, syncope, facial asymmetry and light-headedness. Hematological: Does not bruise/bleed easily. Psychiatric/Behavioral: Negative for behavioral problems and confusion. All other systems reviewed and are negative. Objective  Vital Signs - /82   Pulse 81   Ht 5' 4\" (1.626 m)   Wt 193 lb (87.5 kg)   BMI 33.13 kg/m²   Physical Exam  Vitals and nursing note reviewed. Constitutional:       Appearance: She is well-developed. HENT:      Head: Normocephalic and atraumatic. Right Ear: External ear normal.      Left Ear: External ear normal.      Nose: Nose normal.   Eyes:      General:         Right eye: No discharge. Left eye: No discharge. Pupils: Pupils are equal, round, and reactive to light. Neck:      Vascular: No carotid bruit or JVD. Trachea: No tracheal deviation. Cardiovascular:      Rate and Rhythm: Normal rate and regular rhythm. Heart sounds: Normal heart sounds. No murmur heard. No friction rub. No gallop. Pulmonary:      Effort: Pulmonary effort is normal. No respiratory distress. Breath sounds: No wheezing, rhonchi or rales. Abdominal:      General: Bowel sounds are normal. There is no distension. Palpations: Abdomen is soft. Tenderness: There is no abdominal tenderness. Musculoskeletal:         General: Normal range of motion. Cervical back: Normal range of motion. No edema. Skin:     General: Skin is warm and dry. Capillary Refill: Capillary refill takes less than 2 seconds. Findings: No rash. Neurological:      Mental Status: She is alert and oriented to person, place, and time. Psychiatric:         Behavior: Behavior normal.         Judgment: Judgment normal.         Cardiac data:      4/20/2021 sasha normal ejection fraction 59%, normal perfusion study with no evidence of ischemia or infarction  4/22/20 ACS RACHEL RISK Score 3 (angina, + troponin, ASA ), AUC indication 3, AUC score 8   4/22/20  Cath  95% mid LAD, 4.0 x 28 mm KISHOR, anterior lateral hypokinesis, EF 45%    Other data:    Assessment, Recommendations, & Plan:  60 y.o. female with      Diagnosis Orders   1. Coronary artery disease involving native coronary artery of native heart without angina pectoris  atorvastatin (LIPITOR) 80 MG tablet   2. Mixed hyperlipidemia  atorvastatin (LIPITOR) 80 MG tablet    clopidogrel (PLAVIX) 75 MG tablet    lisinopril (PRINIVIL;ZESTRIL) 2.5 MG tablet    metoprolol succinate (TOPROL XL) 25 MG extended release tablet   3. Essential hypertension  atorvastatin (LIPITOR) 80 MG tablet    clopidogrel (PLAVIX) 75 MG tablet    lisinopril (PRINIVIL;ZESTRIL) 2.5 MG tablet    metoprolol succinate (TOPROL XL) 25 MG extended release tablet       1. CAD-stable. Continue aspirin, Plavix, statin, ACE inhibitor, beta-blocker, and Imdur. 2.  Hyperlipidemia-on statin therapy. last LDL was 66.     3.  Hypertension-blood pressure today 118/82. Stable at home as well. No changes made to medication regimen. No orders of the defined types were placed in this encounter. No follow-ups on file. Call with any questionsor concerns  Follow up with Dasia Garcia MD for non cardiac problems  Report any new problems  Cardiovascular Fitness-Exercise as tolerated.   Strive for 15 minutes of exercise most days of the week. Cardiac / HealthyDiet  Continue current medications as directed  Continue plan of treatment  It is always recommended that you bring your medicationsbottles with you to each visit - this is for your safety! Please do not hesitate to contact me for any questions or concerns. Sincerely yours,    TOMAS Anderson    This dictation was generated by voice recognition computer software. Although all attempts are made to edit dictation for accuracy, there may be errors in the transcription that are not intended.

## 2022-02-14 ENCOUNTER — TELEPHONE (OUTPATIENT)
Dept: CARDIOLOGY CLINIC | Age: 67
End: 2022-02-14

## 2022-02-14 NOTE — TELEPHONE ENCOUNTER
Date: TBD    Cardiologist: Dr. Juanis Gomez    Procedure: Abscess tooth lower left     Surgeon: [address] Office Visit: 8-10-21    Reason for office visit and medical concerns addressed at this office visit: CAD, HTN, hyperlipidemia    Testing Performed and Date of Service:  Stress test 4-20-21    Does the patient have a stent? If so, what type? Not in last year     Current Medications: lipitor, plavix, imdur, lisinopril, toprol hydrodiuril, nitro, celexa, protonix, xanax, ultram clartin    Is the patient currently taking an anticoagulant?  If so, what is the diagnosis the patient has been given to warrant the need for the anticoagulant? plavix     Additional Notes: Med hold on Plavix

## 2022-02-15 ENCOUNTER — OFFICE VISIT (OUTPATIENT)
Dept: CARDIOLOGY CLINIC | Age: 67
End: 2022-02-15
Payer: MEDICARE

## 2022-02-15 VITALS
HEART RATE: 75 BPM | BODY MASS INDEX: 33.63 KG/M2 | WEIGHT: 197 LBS | SYSTOLIC BLOOD PRESSURE: 124 MMHG | HEIGHT: 64 IN | DIASTOLIC BLOOD PRESSURE: 80 MMHG

## 2022-02-15 DIAGNOSIS — I25.10 CORONARY ARTERY DISEASE INVOLVING NATIVE CORONARY ARTERY OF NATIVE HEART WITHOUT ANGINA PECTORIS: Primary | ICD-10-CM

## 2022-02-15 DIAGNOSIS — I10 ESSENTIAL HYPERTENSION: ICD-10-CM

## 2022-02-15 DIAGNOSIS — E78.2 MIXED HYPERLIPIDEMIA: ICD-10-CM

## 2022-02-15 DIAGNOSIS — Z95.5 HISTORY OF CORONARY ARTERY STENT PLACEMENT: ICD-10-CM

## 2022-02-15 PROCEDURE — 1090F PRES/ABSN URINE INCON ASSESS: CPT | Performed by: NURSE PRACTITIONER

## 2022-02-15 PROCEDURE — 99214 OFFICE O/P EST MOD 30 MIN: CPT | Performed by: NURSE PRACTITIONER

## 2022-02-15 PROCEDURE — 4040F PNEUMOC VAC/ADMIN/RCVD: CPT | Performed by: NURSE PRACTITIONER

## 2022-02-15 PROCEDURE — G8417 CALC BMI ABV UP PARAM F/U: HCPCS | Performed by: NURSE PRACTITIONER

## 2022-02-15 PROCEDURE — 1036F TOBACCO NON-USER: CPT | Performed by: NURSE PRACTITIONER

## 2022-02-15 PROCEDURE — G8400 PT W/DXA NO RESULTS DOC: HCPCS | Performed by: NURSE PRACTITIONER

## 2022-02-15 PROCEDURE — G8484 FLU IMMUNIZE NO ADMIN: HCPCS | Performed by: NURSE PRACTITIONER

## 2022-02-15 PROCEDURE — 3017F COLORECTAL CA SCREEN DOC REV: CPT | Performed by: NURSE PRACTITIONER

## 2022-02-15 PROCEDURE — 1123F ACP DISCUSS/DSCN MKR DOCD: CPT | Performed by: NURSE PRACTITIONER

## 2022-02-15 PROCEDURE — 93000 ELECTROCARDIOGRAM COMPLETE: CPT | Performed by: NURSE PRACTITIONER

## 2022-02-15 PROCEDURE — G8427 DOCREV CUR MEDS BY ELIG CLIN: HCPCS | Performed by: NURSE PRACTITIONER

## 2022-02-15 RX ORDER — CLOPIDOGREL BISULFATE 75 MG/1
75 TABLET ORAL DAILY
Qty: 30 TABLET | Refills: 5 | Status: SHIPPED | OUTPATIENT
Start: 2022-02-15 | End: 2022-10-20

## 2022-02-15 RX ORDER — ISOSORBIDE MONONITRATE 30 MG/1
30 TABLET, EXTENDED RELEASE ORAL NIGHTLY
Qty: 30 TABLET | Refills: 5 | Status: SHIPPED | OUTPATIENT
Start: 2022-02-15 | End: 2022-04-04 | Stop reason: SDUPTHER

## 2022-02-15 RX ORDER — LISINOPRIL 2.5 MG/1
1.25 TABLET ORAL DAILY
Qty: 15 TABLET | Refills: 5 | Status: SHIPPED | OUTPATIENT
Start: 2022-02-15 | End: 2022-10-20

## 2022-02-15 RX ORDER — METOPROLOL SUCCINATE 25 MG/1
12.5 TABLET, EXTENDED RELEASE ORAL DAILY
Qty: 15 TABLET | Refills: 5 | Status: SHIPPED | OUTPATIENT
Start: 2022-02-15 | End: 2022-10-20

## 2022-02-15 RX ORDER — ATORVASTATIN CALCIUM 80 MG/1
TABLET, FILM COATED ORAL
Qty: 30 TABLET | Refills: 5 | Status: SHIPPED | OUTPATIENT
Start: 2022-02-15

## 2022-02-15 NOTE — PROGRESS NOTES
[facility], [place]. [address], [address], [address] [facility]  [phone] office  [phone] fax      OFFICE VISIT:  2/15/2022    [address] [facility],[address]: 1955  Reason For Visit:  Kathy Bal is a 73 y.o. female who is here for 6 Month Follow-Up (Patient denies any cardiac symptoms) and Coronary Artery Disease    History:   Diagnosis Orders   1. Coronary artery disease involving native coronary artery of native heart without angina pectoris     2. Essential hypertension  EKG 12 lead   3. History of coronary artery stent placement      s/p NSTEMI with KISHOR to mid LAD by Dr. Eri Zarco on 4/22/20.   4. Mixed hyperlipidemia       The patient presents today for cardiology follow up. Kathy Bal reports doing very well. She brings along labs from PCP done on 11/24/21:  Na 141, K 3.7, Creatiine 0.84, ALT 33, AST 15, TRIG 150, , HDL 45 and LDL 54. The patient denies symptoms to suggest myocardial ischemia, heart failure or arrhythmia. BP is well controlled on current regimen. The patient's PCP monitors cholesterol. Subjective  Kathy Bal denies exertional chest pain, shortness of breath, orthopnea, paroxysmal nocturnal dyspnea, syncope, presyncope, sensed arrhythmia, edema and fatigue. The patient denies numbness or weakness to suggest cerebrovascular accident or transient ischemic attack.       Jovany Britt has the following history as recorded in [facility]:  Patient Active Problem List   Diagnosis Code    ACS (acute coronary syndrome) ([facility] [address] I24.9    History of myocardial infarction I25.2    History of coronary artery stent placement Z95.5    Mixed hyperlipidemia E78.2    Essential hypertension I10    Coronary artery disease involving native coronary artery of native heart without angina pectoris I25.10    Fatigue R53.83     Past Medical History:   Diagnosis Date    Anxiety     CAD (coronary artery disease)     Depression     GERD (gastroesophageal reflux disease)     Hypertension     Psoriasis     PUD (peptic ulcer disease)      Past Surgical History:   Procedure Laterality Date    CARDIAC CATHETERIZATION      80's    CHOLECYSTECTOMY, OPEN      CORONARY ANGIOPLASTY WITH STENT PLACEMENT      TUBAL LIGATION       Family History   Problem Relation Age of Onset    Cancer Father         lung    Cancer Brother         oral     Social History     Tobacco Use    Smoking status: Former Smoker    Smokeless tobacco: Never Used    Tobacco comment: quit in the 80's   Substance Use Topics    Alcohol use: Yes     Comment: occ. 1-2 beers last night      Current Outpatient Medications   Medication Sig Dispense Refill    atorvastatin (LIPITOR) 80 MG tablet TAKE ONE TABLET BY MOUTH EVERY NIGHT AT BEDTIME 30 tablet 0    clopidogrel (PLAVIX) 75 MG tablet Take 1 tablet by mouth daily 30 tablet 5    isosorbide mononitrate (IMDUR) 30 MG extended release tablet Take 1 tablet by mouth nightly 30 tablet 5    lisinopril (PRINIVIL;ZESTRIL) 2.5 MG tablet Take 0.5 tablets by mouth daily 15 tablet 5    metoprolol succinate (TOPROL XL) 25 MG extended release tablet Take 0.5 tablets by mouth daily 15 tablet 5    hydroCHLOROthiazide (HYDRODIURIL) 25 MG tablet Take 25 mg by mouth daily      nitroGLYCERIN (NITROSTAT) 0.4 MG SL tablet Place 1 tablet under the tongue every 5 minutes as needed for Chest pain 25 tablet 3    citalopram (CELEXA) 40 MG tablet Take 40 mg by mouth nightly       aspirin 81 MG tablet Take 81 mg by mouth daily      pantoprazole (PROTONIX) 40 MG tablet Take 40 mg by mouth daily      ALPRAZolam (XANAX) 0.5 MG tablet Take 0.5 mg by mouth 3 times daily as needed for Sleep.  traMADol (ULTRAM) 50 MG tablet Take 50 mg by mouth every 6 hours as needed for Pain.  loratadine (CLARITIN) 10 MG tablet Take 10 mg by mouth daily       No current facility-administered medications for this visit.        Allergies: Codeine    Review of Systems  Constitutional - no appetite change, or unexpected weight change. No fever, chills or diaphoresis. No significant change in activity level or new onset of fatigue. HEENT - no significant rhinorrhea or epistaxis. No tinnitus or significant hearing loss. Eyes - no sudden vision change or amaurosis. No corneal arcus, xantholasma, subconjunctival hemorrhage or discharge. Respiratory - no significant wheezing, stridor, apnea or cough. No dyspnea on exertion or shortness of air. Cardiovascular - no exertional chest pain to suggest myocardial ischemia. No orthopnea or PND. No sensation of sustained arrythmia. No occurrence of slow heart rate. No palpitations. No claudication. Gastrointestinal - no abdominal swelling or pain. No blood in stool. No severe constipation, diarrhea, nausea, or vomiting. Genitourinary - no dysuria, frequency, or urgency. No flank pain or hematuria. Musculoskeletal - no back pain or myalgia. No problems with gait. Extremities - no clubbing, cyanosis or extremity edema. Skin - no color change or rash. No pallor. No new surgical incision. Neurologic - no speech difficulty, facial asymmetry or lateralizing weakness. No seizures, presyncope or syncope. No significant dizziness. Hematologic - no easy bruising or excessive bleeding. Psychiatric - no severe anxiety or insomnia. No confusion. All other review of systems are negative. Objective  Vital Signs - Ht 5' 4\" (1.626 m)   Wt 197 lb (89.4 kg)   BMI 33.81 kg/m²   General - Genette Meaghanen is alert, cooperative, and pleasant. Well groomed. No acute distress. Body habitus - Body mass index is 33.81 kg/m². HEENT - Head is normocephalic. No circumoral cyanosis. Dentition is normal.  EYES -   Lids normal without ptosis. No discharge, edema or subconjunctival hemorrhage. Neck - Symmetrical without apparent mass or lymphadenopathy. Respiratory - Normal respiratory effort without use of accessory muscles. Ausculatation reveals vesicular breath sounds without crackles, wheezes, rub or rhonchi. Cardiovascular - No jugular venous distention. Auscultation reveals regular rate and rhythm. No audible clicks, gallop or rub. No murmur. No lower extremity varicosities. No carotid bruits. Abdominal -  No visible distention, mass or pulsations. Extremities - No clubbing or cyanosis. No statis dermatitis or ulcers. No edema. Musculoskeletal -   No Osler's nodes. No kyphosis or scoliosis. Gait is even and regular without limp or shuffle. Ambulates without assistance. Skin -  Warm and dry; no rash or pallor. No new surgical wound. Neurological - No focal neurological deficits. Thought processes coherent. No apparent tremor. Oriented to person, place and time. Psychiatric -  Appropriate affect and mood. Data reviewed:  4/20/21 Lexiscan  1. Ejection fraction 59%   2. Wall motion study normal   3.  Myocardial perfusion imaging demonstrated homogeneous uptake of the   tracer in all visualized segments no areas of ischemia or infarction   are noted   Summary impressions:   Normal ejection fraction 59% normal perfusion study no evidence of   ischemia or infarction   Signed by Dr Shereen Devlin on 4/23/2021 8:53 AM     10/16/20 echo  Normal left ventricular size and systolic function EF 02%   Mild concentric left ventricular hypertrophy with mild [grade 1] diastolic  dysfunction   Left atrial enlargement   Tricuspid aortic valve with adequate cusp separation and neither  significant stenosis or insufficiency   Mild thickening of a normally mobile mitral valve with mild to moderate   regurgitation   Mild pulmonic insufficiency   Mild right atrial enlargement with normal right ventricular size and   systolic function   Mild tricuspid regurgitation with normal RVSP estimate of 19 mmHg   Normal IVC dimension and respiratory motion consistent with normal right   atrial filling pressures of 5-10 mmHg   No significant pericardial effusion   Dilated aortic root at the level of the sinuses of Valsalva at 3.79 cm and  of the ascending aorta at 4 cm    Signature    Electronically signed by Ronita Cranker MD(Interpreting physician) on 10/16/2020 04:40 PM    Lab Results   Component Value Date    WBC 7.8 04/23/2020    HGB 12.6 04/23/2020    HCT 39.8 04/23/2020    MCV 88.1 04/23/2020     04/23/2020     Lab Results   Component Value Date     04/22/2020    K 4.0 04/22/2020    CL 99 04/22/2020    CO2 24 04/22/2020    BUN 19 04/22/2020    CREATININE 0.8 04/22/2020    GLUCOSE 158 (H) 04/22/2020    CALCIUM 9.2 04/22/2020    PROT 7.1 04/22/2020    LABALBU 4.1 04/22/2020    BILITOT 0.7 04/22/2020    ALKPHOS 85 04/22/2020    AST 13 04/22/2020    ALT 19 04/22/2020    LABGLOM >60 04/22/2020       Lab Results   Component Value Date    CHOL 167 04/22/2020     Lab Results   Component Value Date    TRIG 146 04/22/2020     Lab Results   Component Value Date    HDL 53 (L) 04/22/2020     Lab Results   Component Value Date    LDLCALC 85 04/22/2020       BP Readings from Last 3 Encounters:   08/10/21 118/82   04/12/21 (!) 144/98   10/09/20 (!) 132/94    Pulse Readings from Last 3 Encounters:   08/10/21 81   04/12/21 78   10/09/20 70        Wt Readings from Last 3 Encounters:   02/15/22 197 lb (89.4 kg)   08/10/21 193 lb (87.5 kg)   04/12/21 197 lb (89.4 kg)     Assessment/Plan:   Diagnosis Orders   1. Coronary artery disease involving native coronary artery of native heart without angina pectoris     2. Essential hypertension  EKG 12 lead   3. History of coronary artery stent placement      s/p NSTEMI with KISHOR to mid LAD by Dr. Trisha Banegas on 4/22/20.   4. Mixed hyperlipidemia       EKG today reviewed:  NSR 75 bpm; no acute ischemic changes. Negative T waves V1 and V2 - stable. No ectopy. Stable CV status without overt heart failure, sensed arrhythmia or angina.       CAD - stable on current medical management to include Toprol XL, Lisinopril, Imdur, Plavix, ASA and Lipitor. HTN - normotensive on current regimen. BP goal less than 130/80. Continue same. Hyperlipidemia - 11/21 LDL 54. Labs followed by PCP. Continue Lipitor. LDL goal 70 or less. Recent lab scanned under media. Patient is compliant with medication regimen. Previous cardiac history and records reviewed. Continue current medical management for cardiac related condition. Continue other current medications as directed. Continue to follow up with primary care provider for non cardiac medical problems. If your primary care provider is outside of the [facility], please request that your labs be faxed to this office at [phone]. BP goal 130/80 or less. Call the office with any problems, questions or concerns at [phone]. Cardiology follow up as scheduled in [address] appointments. Educational included in patient instructions. Heart health.       Beatris Gamble, APRN

## 2022-02-15 NOTE — PATIENT INSTRUCTIONS
New instructions for today:  How to take:  NITROGLYCERIN (Nitrostat) 0.4 mg tablets, sublingual.  Nitroglycerin is in a group of drugs called nitrates. Nitroglycerin dilates (widens) blood vessels, making it easier for blood to flow through them and easier for the heart to pump. Dosing Guidelines for Nitroglycerin Tablets  · At the start of an angina (chest pain) attack, place one tablet under the tongue or between the cheek and gum. Do not swallow or chew the tablet; let it dissolve on its own. If necessary, a second and third tablet may be used, with five minutes between using each tablet. If you use a third tablet and your chest pain continues, it is time to seek immediate medical attention. Call 911 immediately and have someone drive you to the emergency room. You may be having a heart attack or other serious heart problem. · To prevent angina from exercise or stress, use 1 tablet 5 to 10 minutes before the activity. Patient Instructions:  Continue current medications as prescribed. Always keep a current medication list. Bring your medications to every office visit. Continue to follow up with primary care provider for non cardiac medical problems. Call the office with any problems, questions or concerns at [phone]. If you have been asked to keep a blood pressure log, do so for 2 weeks. Call the office to report readings to the triage nurse at [phone]. Follow up with cardiologist as scheduled. The following educational material has been included in this after visit summary for your review: Life simple 7. Heart health. Life simple 7  1) Manage blood pressure - high blood pressure is a major risk factor for heart disease and stroke. Keeping blood pressure in health range reduces strain on your heart, arteries and kidneys. Blood pressure goal is less than 130/80. 2) Control cholesterol - contributes to plaque, which can clog arteries and lead to heart disease and stroke.  When you control your cholesterol you are giving your arteries their best chance to remain clear. It is recommended that you get cholesterol lab work done once a year. 3) Reduce blood sugar - most of the food we eat is turning into glucose or blood sugar that our body uses for energy. Over time, high levels of blood sugar can damage your heart, kidneys, eyes and nerves. 4) Get active - living an active life is one of the most rewarding gifts you can give yourself and those you love. Simply put, daily physical activity increases your length and quality of life. Strive to exercise 15 minutes most days of the week. 5)  Eat better - A healthy diet is one of your best weapons for fighting cardiovascular disease. When you eat a heart healthy diet, you improve your chances for feeling good and staying healthy for life. 6)  Lose weight - when you shed extra fat an unnecessary pounds, you reduce the burden on your hear, lungs, blood vessels and skeleton. You give yourself the gift of active living, you lower your blood pressure and help yourself feel better. 7) Stop smoking - cigarette smokers have a higher risk of developing cardiovascular disease. If  You smoke, quitting is the best thing you can do for your health. Check American Heart Association on line for more information on Life's Simple 7 and tips for healthy living. A Healthy Heart: Care Instructions  Your Care Instructions     Coronary artery disease, also called heart disease, occurs when a substance called plaque builds up in the vessels that supply oxygen-rich blood to your heart muscle. This can narrow the blood vessels and reduce blood flow. A heart attack happens when blood flow is completely blocked. A high-fat diet, smoking, and other factors increase the risk of heart disease. Your doctor has found that you have a chance of having heart disease. You can do lots of things to keep your heart healthy.  It may not be easy, but you can change your diet, exercise more, and quit smoking. These steps really work to lower your chance of heart disease. Follow-up care is a key part of your treatment and safety. Be sure to make and go to all appointments, and call your doctor if you are having problems. It's also a good idea to know your test results and keep a list of the medicines you take. How can you care for yourself at home? Diet  · Use less salt when you cook and eat. This helps lower your blood pressure. Taste food before salting. Add only a little salt when you think you need it. With time, your taste buds will adjust to less salt. · Eat fewer snack items, fast foods, canned soups, and other high-salt, high-fat, processed foods. · Read food labels and try to avoid saturated and trans fats. They increase your risk of heart disease by raising cholesterol levels. · Limit the amount of solid fat-butter, margarine, and shortening-you eat. Use olive, peanut, or canola oil when you cook. Bake, broil, and steam foods instead of frying them. · Eat a variety of fruit and vegetables every day. Dark green, deep orange, red, or yellow fruits and vegetables are especially good for you. Examples include spinach, carrots, peaches, and berries. · Foods high in fiber can reduce your cholesterol and provide important vitamins and minerals. High-fiber foods include whole-grain cereals and breads, oatmeal, beans, brown rice, citrus fruits, and apples. · Eat lean proteins. Heart-healthy proteins include seafood, lean meats and poultry, eggs, beans, peas, nuts, seeds, and soy products. · Limit drinks and foods with added sugar. These include candy, desserts, and soda pop. Lifestyle changes  · If your doctor recommends it, get more exercise. Walking is a good choice. Bit by bit, increase the amount you walk every day. Try for at least 30 minutes on most days of the week. You also may want to swim, bike, or do other activities. · Do not smoke.  If you need help quitting, talk to your doctor about stop-smoking programs and medicines. These can increase your chances of quitting for good. Quitting smoking may be the most important step you can take to protect your heart. It is never too late to quit. · Limit alcohol to 2 drinks a day for men and 1 drink a day for women. Too much alcohol can cause health problems. · Manage other health problems such as diabetes, high blood pressure, and high cholesterol. If you think you may have a problem with alcohol or drug use, talk to your doctor. Medicines  · Take your medicines exactly as prescribed. Call your doctor if you think you are having a problem with your medicine. · If your doctor recommends aspirin, take the amount directed each day. Make sure you take aspirin and not another kind of pain reliever, such as acetaminophen (Tylenol). When should you call for help? [id number] if you have symptoms of a heart attack. These may include:  · Chest pain or pressure, or a strange feeling in the chest.  · Sweating. · Shortness of breath. · Pain, pressure, or a strange feeling in the back, neck, jaw, or upper belly or in one or both shoulders or arms. · Lightheadedness or sudden weakness. · A fast or irregular heartbeat. After you call 911, the  may tell you to chew 1 adult-strength or 2 to 4 low-dose aspirin. Wait for an ambulance. Do not try to drive yourself. Watch closely for changes in your health, and be sure to contact your doctor if you have any problems. Where can you learn more? Go to https://WebPTpeEnviable Abode.vWise. org and sign in to your [employer] account. Enter X868 in the [employer] box to learn more about \"A Healthy Heart: Care Instructions. \"     If you do not have an account, please click on the \"Sign Up Now\" link. Current as of: December 16, 2019               Content Version: 12.5  © 2612-1249 Healthwise, Incorporated. Care instructions adapted under license by [employer].  If you have

## 2022-04-04 RX ORDER — ISOSORBIDE MONONITRATE 30 MG/1
30 TABLET, EXTENDED RELEASE ORAL NIGHTLY
Qty: 30 TABLET | Refills: 5 | Status: SHIPPED | OUTPATIENT
Start: 2022-04-04

## 2022-08-16 ENCOUNTER — OFFICE VISIT (OUTPATIENT)
Dept: CARDIOLOGY CLINIC | Age: 67
End: 2022-08-16
Payer: MEDICARE

## 2022-08-16 VITALS
DIASTOLIC BLOOD PRESSURE: 70 MMHG | HEIGHT: 64 IN | BODY MASS INDEX: 32.61 KG/M2 | WEIGHT: 191 LBS | SYSTOLIC BLOOD PRESSURE: 120 MMHG

## 2022-08-16 DIAGNOSIS — I25.10 CORONARY ARTERY DISEASE INVOLVING NATIVE HEART, UNSPECIFIED VESSEL OR LESION TYPE, UNSPECIFIED WHETHER ANGINA PRESENT: Primary | ICD-10-CM

## 2022-08-16 DIAGNOSIS — I10 ESSENTIAL HYPERTENSION: ICD-10-CM

## 2022-08-16 DIAGNOSIS — R42 DIZZINESS: ICD-10-CM

## 2022-08-16 DIAGNOSIS — E78.2 MIXED HYPERLIPIDEMIA: ICD-10-CM

## 2022-08-16 DIAGNOSIS — R07.9 CHEST PAIN, UNSPECIFIED TYPE: ICD-10-CM

## 2022-08-16 PROCEDURE — 99214 OFFICE O/P EST MOD 30 MIN: CPT | Performed by: NURSE PRACTITIONER

## 2022-08-16 PROCEDURE — G8417 CALC BMI ABV UP PARAM F/U: HCPCS | Performed by: NURSE PRACTITIONER

## 2022-08-16 PROCEDURE — 1090F PRES/ABSN URINE INCON ASSESS: CPT | Performed by: NURSE PRACTITIONER

## 2022-08-16 PROCEDURE — 1123F ACP DISCUSS/DSCN MKR DOCD: CPT | Performed by: NURSE PRACTITIONER

## 2022-08-16 PROCEDURE — G8427 DOCREV CUR MEDS BY ELIG CLIN: HCPCS | Performed by: NURSE PRACTITIONER

## 2022-08-16 PROCEDURE — G8400 PT W/DXA NO RESULTS DOC: HCPCS | Performed by: NURSE PRACTITIONER

## 2022-08-16 PROCEDURE — 1036F TOBACCO NON-USER: CPT | Performed by: NURSE PRACTITIONER

## 2022-08-16 PROCEDURE — 3017F COLORECTAL CA SCREEN DOC REV: CPT | Performed by: NURSE PRACTITIONER

## 2022-08-16 RX ORDER — ISOSORBIDE MONONITRATE 30 MG/1
30 TABLET, EXTENDED RELEASE ORAL NIGHTLY
Qty: 30 TABLET | Refills: 5 | Status: CANCELLED | OUTPATIENT
Start: 2022-08-16

## 2022-08-16 RX ORDER — CLOPIDOGREL BISULFATE 75 MG/1
75 TABLET ORAL DAILY
Qty: 30 TABLET | Refills: 5 | Status: CANCELLED | OUTPATIENT
Start: 2022-08-16

## 2022-08-16 RX ORDER — ATORVASTATIN CALCIUM 80 MG/1
TABLET, FILM COATED ORAL
Qty: 30 TABLET | Refills: 5 | Status: CANCELLED | OUTPATIENT
Start: 2022-08-16

## 2022-08-16 RX ORDER — METOPROLOL SUCCINATE 25 MG/1
12.5 TABLET, EXTENDED RELEASE ORAL DAILY
Qty: 30 TABLET | Refills: 5 | Status: CANCELLED | OUTPATIENT
Start: 2022-08-16

## 2022-08-16 RX ORDER — LISINOPRIL 2.5 MG/1
1.25 TABLET ORAL DAILY
Qty: 30 TABLET | Refills: 5 | Status: CANCELLED | OUTPATIENT
Start: 2022-08-16

## 2022-08-16 RX ORDER — HYDROCHLOROTHIAZIDE 25 MG/1
25 TABLET ORAL DAILY
Qty: 30 TABLET | Status: CANCELLED | OUTPATIENT
Start: 2022-08-16

## 2022-08-16 NOTE — PATIENT INSTRUCTIONS
the exercise portion, a second round of radioactive tracer is injected into your body. Your heart rate and EKG will be monitored for another few minutes after administering the drug. Test Preparation:    Bring a list of your current medications. Do not take any of your medications the morning of the test, but bring all morning medications with you as you will take them after the stress portion of the test is completed. No caffeine 12 hours prior to the testing. This includes: coffee, pop/soda, chocolate, cold medications, etc.  Any product that might contain caffeine. No nicotine or alcohol 12 hours prior to your test.   Nothing to eat or drink 4-6 hours prior to appointment time. It is okay to drink small amounts of water during the four hours prior to the test.  Nitroglycerin patches must be taken off 1 hour before testing. Wear comfortable clothing. Please refrain from any strenuous exercise or activities the day before your test, or the day of your test.  The Nuclear Lexiscan Stress test takes about 2 ½ to 3 hours to complete. If for any reason you are unable to keep this appointment, please contact Outpatient Scheduling, [phone], as soon as possible to reschedule. [facility] at the [address] [place] [address] and [address] [facility] located on the first floor of [place] [address] through hospital main entrance and turn immediately to your left. Patient's contact number:  [phone] (home)     Date/Time: 24th 7:30    Carotid Ultrasound   -  No prep. Takes approximately 30 minutes. Carotid ultrasound is a safe, painless procedure that uses sound waves to examine the structure and function of the carotid arteries in the neck. You have two carotid arteries, one on each side of the neck, which deliver blood from the heart to the brain. Carotid ultrasound can reveal whether an artery has any blockage and how well blood flows through the artery.      Carotid ultrasound is healthy diet is one of your best weapons for fighting cardiovascular disease. When you eat a heart healthy diet, you improve your chances for feeling good and staying healthy for life. 6)  Lose weight - when you shed extra fat an unnecessary pounds, you reduce the burden on your hear, lungs, blood vessels and skeleton. You give yourself the gift of active living, you lower your blood pressure and help yourself feel better. 7) Stop smoking - cigarette smokers have a higher risk of developing cardiovascular disease. If  You smoke, quitting is the best thing you can do for your health. Check American Heart Association on line for more information on Life's Simple 7 and tips for healthy living. A Healthy Heart: Care Instructions  Your Care Instructions     Coronary artery disease, also called heart disease, occurs when a substance called plaque builds up in the vessels that supply oxygen-rich blood to your heart muscle. This can narrow the blood vessels and reduce blood flow. A heart attack happens when blood flow is completely blocked. A high-fat diet, smoking, and other factors increase the risk of heart disease. Your doctor has found that you have a chance of having heart disease. You can do lots of things to keep your heart healthy. It may not be easy, but you can change your diet, exercise more, and quit smoking. These steps really work to lower your chance of heart disease. Follow-up care is a key part of your treatment and safety. Be sure to make and go to all appointments, and call your doctor if you are having problems. It's also a good idea to know your test results and keep a list of the medicines you take. How can you care for yourself at home? Diet  Use less salt when you cook and eat. This helps lower your blood pressure. Taste food before salting. Add only a little salt when you think you need it. With time, your taste buds will adjust to less salt.   Eat fewer snack items, fast foods, canned soups, and other high-salt, high-fat, processed foods. Read food labels and try to avoid saturated and trans fats. They increase your risk of heart disease by raising cholesterol levels. Limit the amount of solid fat-butter, margarine, and shortening-you eat. Use olive, peanut, or canola oil when you cook. Bake, broil, and steam foods instead of frying them. Eat a variety of fruit and vegetables every day. Dark green, deep orange, red, or yellow fruits and vegetables are especially good for you. Examples include spinach, carrots, peaches, and berries. Foods high in fiber can reduce your cholesterol and provide important vitamins and minerals. High-fiber foods include whole-grain cereals and breads, oatmeal, beans, brown rice, citrus fruits, and apples. Eat lean proteins. Heart-healthy proteins include seafood, lean meats and poultry, eggs, beans, peas, nuts, seeds, and soy products. Limit drinks and foods with added sugar. These include candy, desserts, and soda pop. Lifestyle changes  If your doctor recommends it, get more exercise. Walking is a good choice. Bit by bit, increase the amount you walk every day. Try for at least 30 minutes on most days of the week. You also may want to swim, bike, or do other activities. Do not smoke. If you need help quitting, talk to your doctor about stop-smoking programs and medicines. These can increase your chances of quitting for good. Quitting smoking may be the most important step you can take to protect your heart. It is never too late to quit. Limit alcohol to 2 drinks a day for men and 1 drink a day for women. Too much alcohol can cause health problems. Manage other health problems such as diabetes, high blood pressure, and high cholesterol. If you think you may have a problem with alcohol or drug use, talk to your doctor. Medicines  Take your medicines exactly as prescribed. Call your doctor if you think you are having a problem with your medicine.   If your doctor recommends aspirin, take the amount directed each day. Make sure you take aspirin and not another kind of pain reliever, such as acetaminophen (Tylenol). When should you call for help? [id number] if you have symptoms of a heart attack. These may include:  Chest pain or pressure, or a strange feeling in the chest.  Sweating. Shortness of breath. Pain, pressure, or a strange feeling in the back, neck, jaw, or upper belly or in one or both shoulders or arms. Lightheadedness or sudden weakness. A fast or irregular heartbeat. After you call 911, the  may tell you to chew 1 adult-strength or 2 to 4 low-dose aspirin. Wait for an ambulance. Do not try to drive yourself. Watch closely for changes in your health, and be sure to contact your doctor if you have any problems. Where can you learn more? Go to https://UPSIDO.com.A Little Easier Recovery. org and sign in to your [employer] account. Enter S509 in the [employer]tiitoo box to learn more about \"A Healthy Heart: Care Instructions. \"     If you do not have an account, please click on the \"Sign Up Now\" link. Current as of: December 16, 2019               Content Version: 12.5  © 6485-6944 Healthwise, Incorporated. Care instructions adapted under license by [facility] ([facility]). If you have questions about a medical condition or this instruction, always ask your healthcare professional. [place] [address] any warranty or liability for your use of this information.

## 2022-08-16 NOTE — PROGRESS NOTES
[facility], [place]. [address] [facility]  [phone] office  [phone] fax      OFFICE VISIT:  8/16/2022    [address] [facility],[address]: 1955  Reason For Visit:  Wyatt jason is a 73 y.o. female who is here for 6 Month Follow-Up (Patient complains of chest pressure off and on x 1 month)    History:   Diagnosis Orders   1. Coronary artery disease involving native heart, unspecified vessel or lesion type, unspecified whether angina present  NM MYOCARDIAL SPECT REST EXERCISE OR RX      2. Essential hypertension  VL DUP CAROTID BILATERAL      3. Mixed hyperlipidemia  VL DUP CAROTID BILATERAL      4. Chest pain, unspecified type  NM MYOCARDIAL SPECT REST EXERCISE OR RX      5. Dizziness  VL DUP CAROTID BILATERAL        The patient presents today for follow up. She reports \"I have some discomfort in my chest that feels like something is pushing from the outside. It kind of stings like when they put my stent in. It is kind of like a pressure and I feel a little short of breath too. \"  She reports being fairly active riding a stationary bike for exercise. Symptoms described as only occurring upon occasion over past few months. Patient reports one episode of vertigo when \"jumping up off the couch\" recently. BP at that time was 116/70. No report of fluid retention or sensed arrhythmia. BP is well controlled on current regimen. The patient's PCP monitors cholesterol. Subjective  Wyatt jason denies orthopnea, paroxysmal nocturnal dyspnea, syncope, presyncope, sensed arrhythmia, edema and fatigue. The patient denies numbness or weakness to suggest cerebrovascular accident or transient ischemic attack. + occasional chest pressure at random over past few months with mild SOTO.  + one episode of dizziness.     Carolyn Garcia has the following history as recorded in [employer]:  Patient Active Problem List   Diagnosis Code    ACS (acute coronary syndrome) (HCC) I24.9    History of myocardial infarction I25.2    History of coronary artery stent placement Z95.5    Mixed hyperlipidemia E78.2    Essential hypertension I10    Coronary artery disease involving native coronary artery of native heart without angina pectoris I25.10    Fatigue R53.83     Past Medical History:   Diagnosis Date    Anxiety     CAD (coronary artery disease)     Depression     GERD (gastroesophageal reflux disease)     Hypertension     Psoriasis     PUD (peptic ulcer disease)      Past Surgical History:   Procedure Laterality Date    CARDIAC CATHETERIZATION      80's    CHOLECYSTECTOMY, OPEN      CORONARY ANGIOPLASTY WITH STENT PLACEMENT      TUBAL LIGATION       Family History   Problem Relation Age of Onset    Cancer Father         lung    Cancer Brother         oral     Social History     Tobacco Use    Smoking status: Former    Smokeless tobacco: Never    Tobacco comments:     quit in the 80's   Substance Use Topics    Alcohol use: Yes     Comment: occ.  1-2 beers last night      Current Outpatient Medications   Medication Sig Dispense Refill    isosorbide mononitrate (IMDUR) 30 MG extended release tablet Take 1 tablet by mouth nightly 30 tablet 5    atorvastatin (LIPITOR) 80 MG tablet TAKE ONE TABLET BY MOUTH EVERY NIGHT AT BEDTIME 30 tablet 5    lisinopril (PRINIVIL;ZESTRIL) 2.5 MG tablet Take 0.5 tablets by mouth daily 15 tablet 5    metoprolol succinate (TOPROL XL) 25 MG extended release tablet Take 0.5 tablets by mouth daily 15 tablet 5    clopidogrel (PLAVIX) 75 MG tablet Take 1 tablet by mouth daily 30 tablet 5    hydroCHLOROthiazide (HYDRODIURIL) 25 MG tablet Take 25 mg by mouth daily      nitroGLYCERIN (NITROSTAT) 0.4 MG SL tablet Place 1 tablet under the tongue every 5 minutes as needed for Chest pain 25 tablet 3    citalopram (CELEXA) 40 MG tablet Take 40 mg by mouth nightly       aspirin 81 MG tablet Take 81 mg by mouth daily      pantoprazole (PROTONIX) 40 MG tablet Take 40 mg by mouth daily      ALPRAZolam (XANAX) 0.5 MG tablet Take 0.5 mg by mouth 3 times daily as needed for Sleep.      traMADol (ULTRAM) 50 MG tablet Take 50 mg by mouth every 6 hours as needed for Pain.      loratadine (CLARITIN) 10 MG tablet Take 10 mg by mouth daily       No current facility-administered medications for this visit. Allergies: Codeine    Review of Systems  Constitutional - no appetite change, or unexpected weight change. No fever, chills or diaphoresis. No significant change in activity level or new onset of fatigue. HEENT - no significant rhinorrhea or epistaxis. No tinnitus or significant hearing loss. Eyes - no sudden vision change or amaurosis. No corneal arcus, xantholasma, subconjunctival hemorrhage or discharge. Respiratory - no significant wheezing, stridor, apnea or cough. + mild SOTO. Cardiovascular - no orthopnea or PND. No sensation of sustained arrythmia. No occurrence of slow heart rate. No palpitations. No claudication. + occasional chest pressure at random over past few months with mild SOTO. Gastrointestinal - no abdominal swelling or pain. No blood in stool. No severe constipation, diarrhea, nausea, or vomiting. Genitourinary - no dysuria, frequency, or urgency. No flank pain or hematuria. Musculoskeletal - no back pain or myalgia. No problems with gait. Extremities - no clubbing, cyanosis or extremity edema. Skin - no color change or rash. No pallor. No new surgical incision. Neurologic - no speech difficulty, facial asymmetry or lateralizing weakness. No seizures, presyncope or syncope.  + one episode of dizziness recently. Hematologic - no easy bruising or excessive bleeding. Psychiatric - no severe anxiety or insomnia. No confusion. All other review of systems are negative.     Objective  Vital Signs - /70   Ht 5' 4\" (1.626 m)   Wt 191 lb (86.6 kg)   BMI 32.79 kg/m²   General - Mago Humphries is alert, cooperative, and ALT 19 04/22/2020    LABGLOM >60 04/22/2020       Lab Results   Component Value Date    CHOL 167 04/22/2020     Lab Results   Component Value Date    TRIG 146 04/22/2020     Lab Results   Component Value Date    HDL 53 (L) 04/22/2020     Lab Results   Component Value Date    LDLCALC 85 04/22/2020       BP Readings from Last 3 Encounters:   08/16/22 120/70   02/15/22 124/80   08/10/21 118/82    Pulse Readings from Last 3 Encounters:   02/15/22 75   08/10/21 81   04/12/21 78        Wt Readings from Last 3 Encounters:   08/16/22 191 lb (86.6 kg)   02/15/22 197 lb (89.4 kg)   08/10/21 193 lb (87.5 kg)     Assessment/Plan:   Diagnosis Orders   1. Coronary artery disease involving native heart, unspecified vessel or lesion type, unspecified whether angina present  NM MYOCARDIAL SPECT REST EXERCISE OR RX      2. Essential hypertension  VL DUP CAROTID BILATERAL      3. Mixed hyperlipidemia  VL DUP CAROTID BILATERAL      4. Chest pain, unspecified type  NM MYOCARDIAL SPECT REST EXERCISE OR RX      5. Dizziness  VL DUP CAROTID BILATERAL        CAD with occasional chest pressure and SOTO over past few months. Current medical management includes Lisinopril, Toprol XL, Imdur, Lipitor and ASA. Schedule Lexiscan rx to rule out myocardial ischemia. HTN - normotensive on current regimen. BP today 120/70. Continue same. Hyperlipidemia - monitored and managed by PCP. LDL 85. Continue Lipitor 80 mg daily. Isolated dizzy spell - check carotid ultrasound for baseline. Patient is compliant with medication regimen. Previous cardiac history and records reviewed. Continue current medical management for cardiac related condition. Continue other current medications as directed. Continue to follow up with primary care provider for non cardiac medical problems. If your primary care provider is outside of the [facility], please request that your labs be faxed to this office at [phone].   BP goal 130/80 or less.  Call the office with any problems, questions or concerns at [phone]. Cardiology follow up as scheduled in [employer] appointments. Educational included in patient instructions. Heart health.       Sherrill Naranjo, TOMAS

## 2022-08-24 ENCOUNTER — HOSPITAL ENCOUNTER (OUTPATIENT)
Dept: NUCLEAR MEDICINE | Age: 67
Discharge: HOME | End: 2022-08-26
Payer: MEDICARE

## 2022-08-24 ENCOUNTER — HOSPITAL ENCOUNTER (OUTPATIENT)
Dept: VASCULAR LAB | Age: 67
Discharge: HOME | End: 2022-08-24
Payer: MEDICARE

## 2022-08-24 DIAGNOSIS — R07.9 CHEST PAIN, UNSPECIFIED TYPE: ICD-10-CM

## 2022-08-24 DIAGNOSIS — R42 DIZZINESS: ICD-10-CM

## 2022-08-24 DIAGNOSIS — E78.2 MIXED HYPERLIPIDEMIA: ICD-10-CM

## 2022-08-24 DIAGNOSIS — I25.10 CORONARY ARTERY DISEASE INVOLVING NATIVE HEART, UNSPECIFIED VESSEL OR LESION TYPE, UNSPECIFIED WHETHER ANGINA PRESENT: ICD-10-CM

## 2022-08-24 DIAGNOSIS — I10 ESSENTIAL HYPERTENSION: ICD-10-CM

## 2022-08-24 LAB
LV EF: 70 %
LVEF MODALITY: NORMAL

## 2022-08-24 PROCEDURE — A9502 TC99M TETROFOSMIN: HCPCS | Performed by: NURSE PRACTITIONER

## 2022-08-24 PROCEDURE — 93016 CV STRESS TEST SUPVJ ONLY: CPT | Performed by: INTERNAL MEDICINE

## 2022-08-24 PROCEDURE — 6360000002 HC RX W HCPCS: Performed by: NURSE PRACTITIONER

## 2022-08-24 PROCEDURE — 93018 CV STRESS TEST I&R ONLY: CPT | Performed by: INTERNAL MEDICINE

## 2022-08-24 PROCEDURE — 93880 EXTRACRANIAL BILAT STUDY: CPT

## 2022-08-24 PROCEDURE — 78452 HT MUSCLE IMAGE SPECT MULT: CPT | Performed by: INTERNAL MEDICINE

## 2022-08-24 PROCEDURE — 93017 CV STRESS TEST TRACING ONLY: CPT

## 2022-08-24 PROCEDURE — 3430000000 HC RX DIAGNOSTIC RADIOPHARMACEUTICAL: Performed by: NURSE PRACTITIONER

## 2022-08-24 RX ADMIN — TETROFOSMIN 24 MILLICURIE: 1.38 INJECTION, POWDER, LYOPHILIZED, FOR SOLUTION INTRAVENOUS at 09:05

## 2022-08-24 RX ADMIN — TETROFOSMIN 8 MILLICURIE: 1.38 INJECTION, POWDER, LYOPHILIZED, FOR SOLUTION INTRAVENOUS at 08:20

## 2022-08-24 RX ADMIN — REGADENOSON 0.4 MG: 0.08 INJECTION, SOLUTION INTRAVENOUS at 08:59

## 2022-09-12 ENCOUNTER — OFFICE VISIT (OUTPATIENT)
Dept: CARDIOLOGY CLINIC | Age: 67
End: 2022-09-12
Payer: MEDICARE

## 2022-09-12 VITALS
HEIGHT: 64 IN | HEART RATE: 71 BPM | SYSTOLIC BLOOD PRESSURE: 118 MMHG | WEIGHT: 190 LBS | DIASTOLIC BLOOD PRESSURE: 76 MMHG | BODY MASS INDEX: 32.44 KG/M2

## 2022-09-12 DIAGNOSIS — E78.2 MIXED HYPERLIPIDEMIA: ICD-10-CM

## 2022-09-12 DIAGNOSIS — I10 ESSENTIAL HYPERTENSION: ICD-10-CM

## 2022-09-12 DIAGNOSIS — I25.10 CORONARY ARTERY DISEASE INVOLVING NATIVE CORONARY ARTERY OF NATIVE HEART WITHOUT ANGINA PECTORIS: Primary | ICD-10-CM

## 2022-09-12 DIAGNOSIS — Z95.5 HISTORY OF CORONARY ARTERY STENT PLACEMENT: ICD-10-CM

## 2022-09-12 PROCEDURE — 3017F COLORECTAL CA SCREEN DOC REV: CPT | Performed by: NURSE PRACTITIONER

## 2022-09-12 PROCEDURE — 1123F ACP DISCUSS/DSCN MKR DOCD: CPT | Performed by: NURSE PRACTITIONER

## 2022-09-12 PROCEDURE — G8417 CALC BMI ABV UP PARAM F/U: HCPCS | Performed by: NURSE PRACTITIONER

## 2022-09-12 PROCEDURE — G8400 PT W/DXA NO RESULTS DOC: HCPCS | Performed by: NURSE PRACTITIONER

## 2022-09-12 PROCEDURE — 1036F TOBACCO NON-USER: CPT | Performed by: NURSE PRACTITIONER

## 2022-09-12 PROCEDURE — G8427 DOCREV CUR MEDS BY ELIG CLIN: HCPCS | Performed by: NURSE PRACTITIONER

## 2022-09-12 PROCEDURE — 1090F PRES/ABSN URINE INCON ASSESS: CPT | Performed by: NURSE PRACTITIONER

## 2022-09-12 PROCEDURE — 99214 OFFICE O/P EST MOD 30 MIN: CPT | Performed by: NURSE PRACTITIONER

## 2022-09-12 NOTE — PATIENT INSTRUCTIONS
New instructions for today:  How to take:  NITROGLYCERIN (Nitrostat) 0.4 mg tablets, sublingual.  Nitroglycerin is in a group of drugs called nitrates. Nitroglycerin dilates (widens) blood vessels, making it easier for blood to flow through them and easier for the heart to pump. Dosing Guidelines for Nitroglycerin Tablets  At the start of an angina (chest pain) attack, place one tablet under the tongue or between the cheek and gum. Do not swallow or chew the tablet; let it dissolve on its own. If necessary, a second and third tablet may be used, with five minutes between using each tablet. If you use a third tablet and your chest pain continues, it is time to seek immediate medical attention. Call 911 immediately and have someone drive you to the emergency room. You may be having a heart attack or other serious heart problem. To prevent angina from exercise or stress, use 1 tablet 5 to 10 minutes before the activity. Patient Instructions:  Continue current medications as prescribed. Always keep a current medication list. Bring your medications to every office visit. Continue to follow up with primary care provider for non cardiac medical problems. Call the office with any problems, questions or concerns at [phone]. If you have been asked to keep a blood pressure log, do so for 2 weeks. Call the office to report readings to the triage nurse at [phone]. Follow up with cardiologist as scheduled. The following educational material has been included in this after visit summary for your review: Life simple 7. Heart health. Life simple 7  1) Manage blood pressure - high blood pressure is a major risk factor for heart disease and stroke. Keeping blood pressure in health range reduces strain on your heart, arteries and kidneys. Blood pressure goal is less than 130/80. 2) Control cholesterol - contributes to plaque, which can clog arteries and lead to heart disease and stroke.  When you control your cholesterol you are giving your arteries their best chance to remain clear. It is recommended that you get cholesterol lab work done once a year. 3) Reduce blood sugar - most of the food we eat is turning into glucose or blood sugar that our body uses for energy. Over time, high levels of blood sugar can damage your heart, kidneys, eyes and nerves. 4) Get active - living an active life is one of the most rewarding gifts you can give yourself and those you love. Simply put, daily physical activity increases your length and quality of life. Strive to exercise 15 minutes most days of the week. 5)  Eat better - A healthy diet is one of your best weapons for fighting cardiovascular disease. When you eat a heart healthy diet, you improve your chances for feeling good and staying healthy for life. 6)  Lose weight - when you shed extra fat an unnecessary pounds, you reduce the burden on your hear, lungs, blood vessels and skeleton. You give yourself the gift of active living, you lower your blood pressure and help yourself feel better. 7) Stop smoking - cigarette smokers have a higher risk of developing cardiovascular disease. If  You smoke, quitting is the best thing you can do for your health. Check American Heart Association on line for more information on Life's Simple 7 and tips for healthy living. A Healthy Heart: Care Instructions  Your Care Instructions     Coronary artery disease, also called heart disease, occurs when a substance called plaque builds up in the vessels that supply oxygen-rich blood to your heart muscle. This can narrow the blood vessels and reduce blood flow. A heart attack happens when blood flow is completely blocked. A high-fat diet, smoking, and other factors increase the risk of heart disease. Your doctor has found that you have a chance of having heart disease. You can do lots of things to keep your heart healthy.  It may not be easy, but you can change your diet, exercise more, and quit smoking. These steps really work to lower your chance of heart disease. Follow-up care is a key part of your treatment and safety. Be sure to make and go to all appointments, and call your doctor if you are having problems. It's also a good idea to know your test results and keep a list of the medicines you take. How can you care for yourself at home? Diet  Use less salt when you cook and eat. This helps lower your blood pressure. Taste food before salting. Add only a little salt when you think you need it. With time, your taste buds will adjust to less salt. Eat fewer snack items, fast foods, canned soups, and other high-salt, high-fat, processed foods. Read food labels and try to avoid saturated and trans fats. They increase your risk of heart disease by raising cholesterol levels. Limit the amount of solid fat-butter, margarine, and shortening-you eat. Use olive, peanut, or canola oil when you cook. Bake, broil, and steam foods instead of frying them. Eat a variety of fruit and vegetables every day. Dark green, deep orange, red, or yellow fruits and vegetables are especially good for you. Examples include spinach, carrots, peaches, and berries. Foods high in fiber can reduce your cholesterol and provide important vitamins and minerals. High-fiber foods include whole-grain cereals and breads, oatmeal, beans, brown rice, citrus fruits, and apples. Eat lean proteins. Heart-healthy proteins include seafood, lean meats and poultry, eggs, beans, peas, nuts, seeds, and soy products. Limit drinks and foods with added sugar. These include candy, desserts, and soda pop. Lifestyle changes  If your doctor recommends it, get more exercise. Walking is a good choice. Bit by bit, increase the amount you walk every day. Try for at least 30 minutes on most days of the week. You also may want to swim, bike, or do other activities. Do not smoke.  If you need help quitting, talk to your doctor about stop-smoking programs and medicines. These can increase your chances of quitting for good. Quitting smoking may be the most important step you can take to protect your heart. It is never too late to quit. Limit alcohol to 2 drinks a day for men and 1 drink a day for women. Too much alcohol can cause health problems. Manage other health problems such as diabetes, high blood pressure, and high cholesterol. If you think you may have a problem with alcohol or drug use, talk to your doctor. Medicines  Take your medicines exactly as prescribed. Call your doctor if you think you are having a problem with your medicine. If your doctor recommends aspirin, take the amount directed each day. Make sure you take aspirin and not another kind of pain reliever, such as acetaminophen (Tylenol). When should you call for help? [id number] if you have symptoms of a heart attack. These may include:  Chest pain or pressure, or a strange feeling in the chest.  Sweating. Shortness of breath. Pain, pressure, or a strange feeling in the back, neck, jaw, or upper belly or in one or both shoulders or arms. Lightheadedness or sudden weakness. A fast or irregular heartbeat. After you call 911, the  may tell you to chew 1 adult-strength or 2 to 4 low-dose aspirin. Wait for an ambulance. Do not try to drive yourself. Watch closely for changes in your health, and be sure to contact your doctor if you have any problems. Where can you learn more? Go to https://[employer]. org and sign in to your [employer] account. Enter E136 in the [facility] box to learn more about \"A Healthy Heart: Care Instructions. \"     If you do not have an account, please click on the \"Sign Up Now\" link. Current as of: December 16, 2019               Content Version: 12.5  © 1732-2800 Healthwise, Incorporated. Care instructions adapted under license by [facility] ([facility]).  If you have questions about a medical condition or this instruction, always ask your healthcare professional. [place] [address] any warranty or liability for your use of this information.

## 2022-09-12 NOTE — PROGRESS NOTES
[facility], [place]. [address], [address], [address] [facility]  [phone] office  [phone] fax      OFFICE VISIT:  9/12/2022    [address] [facility],[address]: 1955  Reason For Visit:  Samaria Becerril is a 73 y.o. female who is here for Follow-up (No cardiac symptoms) and Coronary Artery Disease    History:   Diagnosis Orders   1. Coronary artery disease involving native coronary artery of native heart without angina pectoris        2. Mixed hyperlipidemia        3. Essential hypertension        4. History of coronary artery stent placement      4/22/20  Cath  95% mid LAD, 4.0 x 28 mm KISHOR, anterior lateral hypokinesis, EF 45%        The patient presents today for cardiology follow up after Lexiscan on  8/24/22 which was negative for myocardial ischemia. The patient reports doing well. She had reported occasional \"chest twinges\" which prompted the ischemic study. Today, she reports no exertional angina. The patient denies symptoms to suggest myocardial ischemia, heart failure or arrhythmia. BP is well controlled on current regimen. The patient's PCP monitors cholesterol. Subjective  Samaria Becerril denies exertional chest pain, shortness of breath, orthopnea, paroxysmal nocturnal dyspnea, syncope, presyncope, sensed arrhythmia, edema and fatigue. The patient denies numbness or weakness to suggest cerebrovascular accident or transient ischemic attack.     Stephanie Rosales has the following history as recorded in [facility]:  Patient Active Problem List   Diagnosis Code    ACS (acute coronary syndrome) ([facility]) I24.9    History of myocardial infarction I25.2    History of coronary artery stent placement Z95.5    Mixed hyperlipidemia E78.2    Essential hypertension I10    Coronary artery disease involving native coronary artery of native heart without angina pectoris I25.10    Fatigue R53.83     Past Medical History:   Diagnosis Date    Anxiety     CAD (coronary artery disease)     Depression     GERD (gastroesophageal reflux disease)     Hypertension     Psoriasis     PUD (peptic ulcer disease)      Past Surgical History:   Procedure Laterality Date    CARDIAC CATHETERIZATION      80's    CHOLECYSTECTOMY, OPEN      CORONARY ANGIOPLASTY WITH STENT PLACEMENT      TUBAL LIGATION       Family History   Problem Relation Age of Onset    Cancer Father         lung    Cancer Brother         oral     Social History     Tobacco Use    Smoking status: Former    Smokeless tobacco: Never    Tobacco comments:     quit in the 80's   Substance Use Topics    Alcohol use: Yes     Comment: occ. 1-2 beers last night      Current Outpatient Medications   Medication Sig Dispense Refill    isosorbide mononitrate (IMDUR) 30 MG extended release tablet Take 1 tablet by mouth nightly 30 tablet 5    atorvastatin (LIPITOR) 80 MG tablet TAKE ONE TABLET BY MOUTH EVERY NIGHT AT BEDTIME 30 tablet 5    lisinopril (PRINIVIL;ZESTRIL) 2.5 MG tablet Take 0.5 tablets by mouth daily 15 tablet 5    metoprolol succinate (TOPROL XL) 25 MG extended release tablet Take 0.5 tablets by mouth daily 15 tablet 5    clopidogrel (PLAVIX) 75 MG tablet Take 1 tablet by mouth daily 30 tablet 5    hydroCHLOROthiazide (HYDRODIURIL) 25 MG tablet Take 25 mg by mouth daily      nitroGLYCERIN (NITROSTAT) 0.4 MG SL tablet Place 1 tablet under the tongue every 5 minutes as needed for Chest pain 25 tablet 3    citalopram (CELEXA) 40 MG tablet Take 40 mg by mouth nightly       aspirin 81 MG tablet Take 81 mg by mouth daily      pantoprazole (PROTONIX) 40 MG tablet Take 40 mg by mouth daily      ALPRAZolam (XANAX) 0.5 MG tablet Take 0.5 mg by mouth 3 times daily as needed. traMADol (ULTRAM) 50 MG tablet Take 50 mg by mouth every 6 hours as needed for Pain.      loratadine (CLARITIN) 10 MG tablet Take 10 mg by mouth daily       No current facility-administered medications for this visit.        Allergies: Codeine    Review of Systems  Constitutional - no appetite change, or unexpected weight change. No fever, chills or diaphoresis. No significant change in activity level or new onset of fatigue. HEENT - no significant rhinorrhea or epistaxis. No tinnitus or significant hearing loss. Eyes - no sudden vision change or amaurosis. No corneal arcus, xantholasma, subconjunctival hemorrhage or discharge. Respiratory - no significant wheezing, stridor, apnea or cough. No dyspnea on exertion or shortness of air. Cardiovascular - no exertional chest pain to suggest myocardial ischemia. No orthopnea or PND. No sensation of sustained arrythmia. No occurrence of slow heart rate. No palpitations. No claudication. Gastrointestinal - no abdominal swelling or pain. No blood in stool. No severe constipation, diarrhea, nausea, or vomiting. Genitourinary - no dysuria, frequency, or urgency. No flank pain or hematuria. Musculoskeletal - no back pain or myalgia. No problems with gait. Extremities - no clubbing, cyanosis or extremity edema. Skin - no color change or rash. No pallor. No new surgical incision. Neurologic - no speech difficulty, facial asymmetry or lateralizing weakness. No seizures, presyncope or syncope. No significant dizziness. Hematologic - no easy bruising or excessive bleeding. Psychiatric - no severe anxiety or insomnia. No confusion. All other review of systems are negative. Objective  Vital Signs - /76   Pulse 71   Ht 5' 4\" (1.626 m)   Wt 190 lb (86.2 kg)   BMI 32.61 kg/m²   General - Clifton Paling is alert, cooperative, and pleasant. Well groomed. No acute distress. Body habitus - Body mass index is 32.61 kg/m². HEENT - Head is normocephalic. No circumoral cyanosis. Dentition is normal.  EYES -   Lids normal without ptosis. No discharge, edema or subconjunctival hemorrhage. Neck - Symmetrical without apparent mass or lymphadenopathy.    Respiratory - Normal respiratory effort without use of accessory muscles. Ausculatation reveals vesicular breath sounds without crackles, wheezes, rub or rhonchi. Cardiovascular - No jugular venous distention. Auscultation reveals regular rate and rhythm. No audible clicks, gallop or rub. No murmur. No lower extremity varicosities. No carotid bruits. Abdominal -  No visible distention, mass or pulsations. Extremities - No clubbing or cyanosis. No statis dermatitis or ulcers. No edema. Musculoskeletal -   No Osler's nodes. No kyphosis or scoliosis. Gait is even and regular without limp or shuffle. Ambulates without assistance. Skin -  Warm and dry; no rash or pallor. No new surgical wound. Neurological - No focal neurological deficits. Thought processes coherent. No apparent tremor. Oriented to person, place and time. Psychiatric -  Appropriate affect and mood. Data reviewed:  8/24/22 Lexiscan  1. Ejection fraction 70%   2. Wall motion study unremarkable   3. Myocardial perfusion imaging demonstrated homogeneous uptake of the   tracer in all visualized segments no areas of ischemia or infarction   are identified. Summary impressions:   Normal ejection fraction 70% normal myocardial perfusion imaging study   without evidence of ischemia or prior infarction.    Signed by Dr Emilie Elizabeth     10/16/20 echo  Normal left ventricular size and systolic function EF 44%   Mild concentric left ventricular hypertrophy with mild [grade 1] diastolic   dysfunction   Left atrial enlargement   Tricuspid aortic valve with adequate cusp separation and neither   significant stenosis or insufficiency   Mild thickening of a normally mobile mitral valve with mild to moderate   regurgitation   Mild pulmonic insufficiency   Mild right atrial enlargement with normal right ventricular size and   systolic function   Mild tricuspid regurgitation with normal RVSP estimate of 19 mmHg   Normal IVC dimension and respiratory motion consistent with normal right atrial filling pressures of 5-10 mmHg   No significant pericardial effusion   Dilated aortic root at the level of the sinuses of Valsalva at 3.79 cm and  of the ascending aorta at 4 cm   Electronically signed by Shahana Tinsley MD(Interpreting physician)   on 10/16/2020 04:40 PM    Lab Results   Component Value Date    WBC 7.8 04/23/2020    HGB 12.6 04/23/2020    HCT 39.8 04/23/2020    MCV 88.1 04/23/2020     04/23/2020     Lab Results   Component Value Date     04/22/2020    K 4.0 04/22/2020    CL 99 04/22/2020    CO2 24 04/22/2020    BUN 19 04/22/2020    CREATININE 0.8 04/22/2020    GLUCOSE 158 (H) 04/22/2020    CALCIUM 9.2 04/22/2020    PROT 7.1 04/22/2020    LABALBU 4.1 04/22/2020    BILITOT 0.7 04/22/2020    ALKPHOS 85 04/22/2020    AST 13 04/22/2020    ALT 19 04/22/2020    LABGLOM >60 04/22/2020       Lab Results   Component Value Date    CHOL 167 04/22/2020     Lab Results   Component Value Date    TRIG 146 04/22/2020     Lab Results   Component Value Date    HDL 53 (L) 04/22/2020     Lab Results   Component Value Date    LDLCALC 85 04/22/2020     BP Readings from Last 3 Encounters:   09/12/22 118/76   08/16/22 120/70   02/15/22 124/80    Pulse Readings from Last 3 Encounters:   09/12/22 71   02/15/22 75   08/10/21 81        Wt Readings from Last 3 Encounters:   09/12/22 190 lb (86.2 kg)   08/16/22 191 lb (86.6 kg)   02/15/22 197 lb (89.4 kg)     Assessment/Plan:   Diagnosis Orders   1. Coronary artery disease involving native coronary artery of native heart without angina pectoris        2. Mixed hyperlipidemia        3. Essential hypertension        4. History of coronary artery stent placement      4/22/20  Cath  95% mid LAD, 4.0 x 28 mm KISHOR, anterior lateral hypokinesis, EF 45%        Stable CV status without overt heart failure, sensed arrhythmia or angina. CAD - stable on current medical management. 8/22 Lexiscan negative for myocardial ischemia.     Continue Toprol XL, Lisinopril, Imdur Lipitor, Plavix and ASA. HTN - normotensive on current regimen. BP today 118/76. Continue same. Hyperlipidemia - monitored and managed by PCP. LDL 54 - see results under media. Continue Lipitor 80 mg daily. Patient is compliant with medication regimen. Previous cardiac history and records reviewed. Continue current medical management for cardiac related condition. Continue other current medications as directed. Continue to follow up with primary care provider for non cardiac medical problems. If your primary care provider is outside of the [facility], please request that your labs be faxed to this office at [phone]. BP goal 130/80 or less. Call the office with any problems, questions or concerns at [phone]. Cardiology follow up as scheduled in [address] appointments. Educational included in patient instructions. Heart health.       TOMAS Zaragoza

## 2022-10-20 DIAGNOSIS — E78.2 MIXED HYPERLIPIDEMIA: ICD-10-CM

## 2022-10-20 DIAGNOSIS — I10 ESSENTIAL HYPERTENSION: ICD-10-CM

## 2022-10-20 RX ORDER — METOPROLOL SUCCINATE 25 MG/1
TABLET, EXTENDED RELEASE ORAL
Qty: 15 TABLET | Refills: 7 | Status: SHIPPED | OUTPATIENT
Start: 2022-10-20

## 2022-10-20 RX ORDER — LISINOPRIL 2.5 MG/1
TABLET ORAL
Qty: 15 TABLET | Refills: 7 | Status: SHIPPED | OUTPATIENT
Start: 2022-10-20

## 2022-10-20 RX ORDER — CLOPIDOGREL BISULFATE 75 MG/1
TABLET ORAL
Qty: 30 TABLET | Refills: 7 | Status: SHIPPED | OUTPATIENT
Start: 2022-10-20

## 2023-03-06 RX ORDER — ISOSORBIDE MONONITRATE 30 MG/1
TABLET, EXTENDED RELEASE ORAL
Qty: 30 TABLET | Refills: 5 | Status: SHIPPED | OUTPATIENT
Start: 2023-03-06

## 2023-03-23 RX ORDER — ISOSORBIDE MONONITRATE 30 MG/1
30 TABLET, EXTENDED RELEASE ORAL NIGHTLY
Qty: 30 TABLET | Refills: 5 | Status: SHIPPED | OUTPATIENT
Start: 2023-03-23

## 2023-04-05 ENCOUNTER — OFFICE VISIT (OUTPATIENT)
Dept: CARDIOLOGY CLINIC | Age: 68
End: 2023-04-05
Payer: MEDICARE

## 2023-04-05 VITALS
DIASTOLIC BLOOD PRESSURE: 62 MMHG | HEIGHT: 64 IN | WEIGHT: 188 LBS | BODY MASS INDEX: 32.1 KG/M2 | HEART RATE: 80 BPM | SYSTOLIC BLOOD PRESSURE: 122 MMHG

## 2023-04-05 DIAGNOSIS — I25.10 CORONARY ARTERY DISEASE INVOLVING NATIVE CORONARY ARTERY OF NATIVE HEART WITHOUT ANGINA PECTORIS: ICD-10-CM

## 2023-04-05 DIAGNOSIS — I10 ESSENTIAL HYPERTENSION: ICD-10-CM

## 2023-04-05 DIAGNOSIS — R07.9 CHEST PAIN, UNSPECIFIED TYPE: Primary | ICD-10-CM

## 2023-04-05 DIAGNOSIS — E78.2 MIXED HYPERLIPIDEMIA: ICD-10-CM

## 2023-04-05 PROCEDURE — 93000 ELECTROCARDIOGRAM COMPLETE: CPT | Performed by: INTERNAL MEDICINE

## 2023-04-05 PROCEDURE — 3074F SYST BP LT 130 MM HG: CPT | Performed by: INTERNAL MEDICINE

## 2023-04-05 PROCEDURE — 3017F COLORECTAL CA SCREEN DOC REV: CPT | Performed by: INTERNAL MEDICINE

## 2023-04-05 PROCEDURE — 99214 OFFICE O/P EST MOD 30 MIN: CPT | Performed by: INTERNAL MEDICINE

## 2023-04-05 PROCEDURE — 3078F DIAST BP <80 MM HG: CPT | Performed by: INTERNAL MEDICINE

## 2023-04-05 PROCEDURE — G8427 DOCREV CUR MEDS BY ELIG CLIN: HCPCS | Performed by: INTERNAL MEDICINE

## 2023-04-05 PROCEDURE — G8417 CALC BMI ABV UP PARAM F/U: HCPCS | Performed by: INTERNAL MEDICINE

## 2023-04-05 PROCEDURE — 1123F ACP DISCUSS/DSCN MKR DOCD: CPT | Performed by: INTERNAL MEDICINE

## 2023-04-05 PROCEDURE — 1036F TOBACCO NON-USER: CPT | Performed by: INTERNAL MEDICINE

## 2023-04-05 PROCEDURE — G8400 PT W/DXA NO RESULTS DOC: HCPCS | Performed by: INTERNAL MEDICINE

## 2023-04-05 PROCEDURE — 1090F PRES/ABSN URINE INCON ASSESS: CPT | Performed by: INTERNAL MEDICINE

## 2023-04-05 RX ORDER — LISINOPRIL 2.5 MG/1
1.25 TABLET ORAL DAILY
Qty: 15 TABLET | Refills: 5 | Status: SHIPPED | OUTPATIENT
Start: 2023-04-05

## 2023-04-05 RX ORDER — NITROGLYCERIN 0.4 MG/1
0.4 TABLET SUBLINGUAL EVERY 5 MIN PRN
Qty: 25 TABLET | Refills: 3 | Status: SHIPPED | OUTPATIENT
Start: 2023-04-05

## 2023-04-05 RX ORDER — METOPROLOL SUCCINATE 25 MG/1
12.5 TABLET, EXTENDED RELEASE ORAL DAILY
Qty: 15 TABLET | Refills: 5 | Status: SHIPPED | OUTPATIENT
Start: 2023-04-05

## 2023-04-05 RX ORDER — ATORVASTATIN CALCIUM 80 MG/1
TABLET, FILM COATED ORAL
Qty: 30 TABLET | Refills: 5 | Status: SHIPPED | OUTPATIENT
Start: 2023-04-05

## 2023-04-05 RX ORDER — CLOPIDOGREL BISULFATE 75 MG/1
75 TABLET ORAL DAILY
Qty: 30 TABLET | Refills: 5 | Status: SHIPPED | OUTPATIENT
Start: 2023-04-05

## 2023-04-05 ASSESSMENT — ENCOUNTER SYMPTOMS
DIARRHEA: 0
BACK PAIN: 0
ABDOMINAL DISTENTION: 0
BLOOD IN STOOL: 0
EYE DISCHARGE: 0
WHEEZING: 0
SHORTNESS OF BREATH: 0
COUGH: 0
VOMITING: 0
CONSTIPATION: 0

## 2023-04-05 NOTE — PROGRESS NOTES
consistently reduced. She is also on hydrochlorothiazide 25 mg daily. REVIEW OF SYSTEMS:  Review of Systems   Constitutional:  Negative for activity change, fatigue and fever. HENT:  Negative for ear pain, hearing loss and tinnitus. Eyes:  Negative for discharge and visual disturbance. Respiratory:  Negative for cough, shortness of breath and wheezing. Cardiovascular:  Negative for chest pain, palpitations and leg swelling. Gastrointestinal:  Negative for abdominal distention, blood in stool, constipation, diarrhea and vomiting. Endocrine: Negative for cold intolerance, heat intolerance, polydipsia and polyuria. Genitourinary:  Negative for dysuria and hematuria. Musculoskeletal:  Negative for arthralgias, back pain and myalgias. Skin:  Negative for pallor and rash. Neurological:  Negative for seizures, syncope, weakness and headaches. Psychiatric/Behavioral:  Negative for behavioral problems and dysphoric mood.       Past Medical History:      Diagnosis Date    Anxiety     CAD (coronary artery disease)     Depression     GERD (gastroesophageal reflux disease)     Hypertension     Psoriasis     PUD (peptic ulcer disease)        Past Surgical History:      Procedure Laterality Date    CARDIAC CATHETERIZATION      90's    CHOLECYSTECTOMY, OPEN      CORONARY ANGIOPLASTY WITH STENT PLACEMENT      TUBAL LIGATION         Medications:  Current Outpatient Medications   Medication Sig Dispense Refill    clopidogrel (PLAVIX) 75 MG tablet Take 1 tablet by mouth daily 30 tablet 5    lisinopril (PRINIVIL;ZESTRIL) 2.5 MG tablet Take 0.5 tablets by mouth daily 15 tablet 5    metoprolol succinate (TOPROL XL) 25 MG extended release tablet Take 0.5 tablets by mouth daily 15 tablet 5    atorvastatin (LIPITOR) 80 MG tablet TAKE ONE TABLET BY MOUTH EVERY NIGHT AT BEDTIME 30 tablet 5    nitroGLYCERIN (NITROSTAT) 0.4 MG SL tablet Place 1 tablet under the tongue every 5 minutes as needed for Chest pain 25 tablet

## 2023-08-30 ENCOUNTER — TELEPHONE (OUTPATIENT)
Dept: CARDIOLOGY CLINIC | Age: 68
End: 2023-08-30

## 2023-08-30 NOTE — TELEPHONE ENCOUNTER
Called and LVM with patient to reschedule 10/5 appt due to [place] out, [address] [place] [address] Street 8/30

## 2023-10-09 ENCOUNTER — OFFICE VISIT (OUTPATIENT)
Dept: CARDIOLOGY CLINIC | Age: 68
End: 2023-10-09
Payer: MEDICARE

## 2023-10-09 VITALS
WEIGHT: 196 LBS | OXYGEN SATURATION: 98 % | SYSTOLIC BLOOD PRESSURE: 124 MMHG | DIASTOLIC BLOOD PRESSURE: 82 MMHG | HEIGHT: 64 IN | HEART RATE: 72 BPM | BODY MASS INDEX: 33.46 KG/M2

## 2023-10-09 DIAGNOSIS — E78.2 MIXED HYPERLIPIDEMIA: ICD-10-CM

## 2023-10-09 DIAGNOSIS — I10 ESSENTIAL HYPERTENSION: ICD-10-CM

## 2023-10-09 DIAGNOSIS — I25.10 CORONARY ARTERY DISEASE INVOLVING NATIVE CORONARY ARTERY OF NATIVE HEART WITHOUT ANGINA PECTORIS: Primary | ICD-10-CM

## 2023-10-09 DIAGNOSIS — Z95.5 HISTORY OF CORONARY ARTERY STENT PLACEMENT: ICD-10-CM

## 2023-10-09 PROCEDURE — G8400 PT W/DXA NO RESULTS DOC: HCPCS | Performed by: NURSE PRACTITIONER

## 2023-10-09 PROCEDURE — 99214 OFFICE O/P EST MOD 30 MIN: CPT | Performed by: NURSE PRACTITIONER

## 2023-10-09 PROCEDURE — 1090F PRES/ABSN URINE INCON ASSESS: CPT | Performed by: NURSE PRACTITIONER

## 2023-10-09 PROCEDURE — 3079F DIAST BP 80-89 MM HG: CPT | Performed by: NURSE PRACTITIONER

## 2023-10-09 PROCEDURE — 3074F SYST BP LT 130 MM HG: CPT | Performed by: NURSE PRACTITIONER

## 2023-10-09 PROCEDURE — G8484 FLU IMMUNIZE NO ADMIN: HCPCS | Performed by: NURSE PRACTITIONER

## 2023-10-09 PROCEDURE — 3017F COLORECTAL CA SCREEN DOC REV: CPT | Performed by: NURSE PRACTITIONER

## 2023-10-09 PROCEDURE — G8417 CALC BMI ABV UP PARAM F/U: HCPCS | Performed by: NURSE PRACTITIONER

## 2023-10-09 PROCEDURE — 1036F TOBACCO NON-USER: CPT | Performed by: NURSE PRACTITIONER

## 2023-10-09 PROCEDURE — 1123F ACP DISCUSS/DSCN MKR DOCD: CPT | Performed by: NURSE PRACTITIONER

## 2023-10-09 PROCEDURE — G8427 DOCREV CUR MEDS BY ELIG CLIN: HCPCS | Performed by: NURSE PRACTITIONER

## 2023-10-09 RX ORDER — METOPROLOL SUCCINATE 25 MG/1
12.5 TABLET, EXTENDED RELEASE ORAL DAILY
Qty: 15 TABLET | Refills: 5 | Status: SHIPPED | OUTPATIENT
Start: 2023-10-09

## 2023-10-09 RX ORDER — CLOPIDOGREL BISULFATE 75 MG/1
75 TABLET ORAL DAILY
Qty: 30 TABLET | Refills: 5 | Status: SHIPPED | OUTPATIENT
Start: 2023-10-09

## 2023-10-09 RX ORDER — ATORVASTATIN CALCIUM 80 MG/1
TABLET, FILM COATED ORAL
Qty: 30 TABLET | Refills: 5 | Status: SHIPPED | OUTPATIENT
Start: 2023-10-09

## 2023-10-09 RX ORDER — LISINOPRIL 2.5 MG/1
1.25 TABLET ORAL DAILY
Qty: 15 TABLET | Refills: 5 | Status: SHIPPED | OUTPATIENT
Start: 2023-10-09

## 2023-10-09 RX ORDER — ISOSORBIDE MONONITRATE 30 MG/1
30 TABLET, EXTENDED RELEASE ORAL NIGHTLY
Qty: 30 TABLET | Refills: 5 | Status: SHIPPED | OUTPATIENT
Start: 2023-10-09

## 2023-10-09 NOTE — PATIENT INSTRUCTIONS
New instructions for today:  How to take:  NITROGLYCERIN (Nitrostat) 0.4 mg tablets, sublingual.  Nitroglycerin is in a group of drugs called nitrates. Nitroglycerin dilates (widens) blood vessels, making it easier for blood to flow through them and easier for the heart to pump. Dosing Guidelines for Nitroglycerin Tablets  At the start of an angina (chest pain) attack, place one tablet under the tongue or between the cheek and gum. Do not swallow or chew the tablet; let it dissolve on its own. If necessary, a second and third tablet may be used, with five minutes between using each tablet. If you use a third tablet and your chest pain continues, it is time to seek immediate medical attention. Call 911 immediately and have someone drive you to the emergency room. You may be having a heart attack or other serious heart problem. To prevent angina from exercise or stress, use 1 tablet 5 to 10 minutes before the activity. Patient Instructions:  Continue current medications as prescribed. Always keep a current medication list. Bring your medications to every office visit. Continue to follow up with primary care provider for non cardiac medical problems. Call the office with any problems, questions or concerns at [phone]. If you have been asked to keep a blood pressure log, do so for 2 weeks. Call the office to report readings to the triage nurse at [phone]. Follow up with cardiologist as scheduled. The following educational material has been included in this after visit summary for your review: Life simple 7. Heart health. Life simple 7  1) Manage blood pressure - high blood pressure is a major risk factor for heart disease and stroke. Keeping blood pressure in health range reduces strain on your heart, arteries and kidneys. Blood pressure goal is less than 130/80. 2) Control cholesterol - contributes to plaque, which can clog arteries and lead to heart disease and stroke.  When you

## 2023-10-09 NOTE — PROGRESS NOTES
04/05/23 188 lb (85.3 kg)   09/12/22 190 lb (86.2 kg)     Assessment/Plan:   Diagnosis Orders   1. Coronary artery disease involving native coronary artery of native heart without angina pectoris        2. History of coronary artery stent placement        3. Essential hypertension        4. Mixed hyperlipidemia          Stable CV status without overt heart failure, sensed arrhythmia or angina. CAD - stable on current medical management. Continue same. HTN - well controlled on current regimen. /82 today. Continue same. Hyperlipidemia - monitored and managed by PCP. Continues on Lipitor. Patient is compliant with medication regimen. Previous cardiac history and records reviewed. Continue current medical management for cardiac related condition. Continue other current medications as directed. Continue to follow up with primary care provider for non cardiac medical problems. If your primary care provider is outside of the [facility] [place], please request that your labs be faxed to this office at [phone]. BP goal 130/80 or less. Call the office with any problems, questions or concerns at [phone]. Cardiology follow up as scheduled in [employer] appointments. Educational included in patient instructions. Heart health.       TOMAS Felipe

## 2024-01-08 ENCOUNTER — TELEPHONE (OUTPATIENT)
Dept: CARDIOLOGY CLINIC | Age: 69
End: 2024-01-08

## 2024-01-08 NOTE — TELEPHONE ENCOUNTER
Date: 1-18-24    Cardiologist: Dr. Zaman    Procedure: Colonoscopy     Surgeon: Ynes diamond     Last Office Visit: 10-9-23    Reason for office visit and medical concerns addressed at this office visit: CAD, HTN, hyperlipidemia    Testing Performed and Date of Service:  EKG 4-5-23    Does the patient have a stent? If so, what type?  Not in last year     Current Medications: xanax, ASA, lipitor, celexa, plavix, imdur, lisinopril, claritin, metoprolol succ, nitro, protonix, ultram    Is the patient currently taking an anticoagulant? If so, what is the diagnosis the patient has been given to warrant the need for the anticoagulant? Plavix and ASA     Additional Notes: Med hold on Plavix and ASA

## 2024-05-20 ENCOUNTER — OFFICE VISIT (OUTPATIENT)
Dept: CARDIOLOGY CLINIC | Age: 69
End: 2024-05-20
Payer: MEDICARE

## 2024-05-20 VITALS
SYSTOLIC BLOOD PRESSURE: 128 MMHG | BODY MASS INDEX: 33.29 KG/M2 | HEART RATE: 94 BPM | HEIGHT: 64 IN | DIASTOLIC BLOOD PRESSURE: 78 MMHG | WEIGHT: 195 LBS

## 2024-05-20 DIAGNOSIS — Z95.5 HISTORY OF CORONARY ARTERY STENT PLACEMENT: ICD-10-CM

## 2024-05-20 DIAGNOSIS — I25.10 CORONARY ARTERY DISEASE INVOLVING NATIVE CORONARY ARTERY OF NATIVE HEART, UNSPECIFIED WHETHER ANGINA PRESENT: ICD-10-CM

## 2024-05-20 DIAGNOSIS — I10 ESSENTIAL HYPERTENSION: ICD-10-CM

## 2024-05-20 DIAGNOSIS — E78.2 MIXED HYPERLIPIDEMIA: ICD-10-CM

## 2024-05-20 DIAGNOSIS — R07.9 CHEST PAIN, UNSPECIFIED TYPE: Primary | ICD-10-CM

## 2024-05-20 DIAGNOSIS — I25.10 CORONARY ARTERY DISEASE INVOLVING NATIVE HEART, UNSPECIFIED VESSEL OR LESION TYPE, UNSPECIFIED WHETHER ANGINA PRESENT: ICD-10-CM

## 2024-05-20 PROCEDURE — 3017F COLORECTAL CA SCREEN DOC REV: CPT | Performed by: NURSE PRACTITIONER

## 2024-05-20 PROCEDURE — 3074F SYST BP LT 130 MM HG: CPT | Performed by: NURSE PRACTITIONER

## 2024-05-20 PROCEDURE — G8427 DOCREV CUR MEDS BY ELIG CLIN: HCPCS | Performed by: NURSE PRACTITIONER

## 2024-05-20 PROCEDURE — 1123F ACP DISCUSS/DSCN MKR DOCD: CPT | Performed by: NURSE PRACTITIONER

## 2024-05-20 PROCEDURE — 1090F PRES/ABSN URINE INCON ASSESS: CPT | Performed by: NURSE PRACTITIONER

## 2024-05-20 PROCEDURE — 1036F TOBACCO NON-USER: CPT | Performed by: NURSE PRACTITIONER

## 2024-05-20 PROCEDURE — 3078F DIAST BP <80 MM HG: CPT | Performed by: NURSE PRACTITIONER

## 2024-05-20 PROCEDURE — G8417 CALC BMI ABV UP PARAM F/U: HCPCS | Performed by: NURSE PRACTITIONER

## 2024-05-20 PROCEDURE — 99214 OFFICE O/P EST MOD 30 MIN: CPT | Performed by: NURSE PRACTITIONER

## 2024-05-20 PROCEDURE — 93000 ELECTROCARDIOGRAM COMPLETE: CPT | Performed by: NURSE PRACTITIONER

## 2024-05-20 PROCEDURE — G8400 PT W/DXA NO RESULTS DOC: HCPCS | Performed by: NURSE PRACTITIONER

## 2024-05-20 RX ORDER — ISOSORBIDE MONONITRATE 30 MG/1
30 TABLET, EXTENDED RELEASE ORAL NIGHTLY
Qty: 30 TABLET | Refills: 5 | Status: SHIPPED | OUTPATIENT
Start: 2024-05-20

## 2024-05-20 RX ORDER — CLOPIDOGREL BISULFATE 75 MG/1
75 TABLET ORAL DAILY
Qty: 30 TABLET | Refills: 5 | Status: SHIPPED | OUTPATIENT
Start: 2024-05-20

## 2024-05-20 RX ORDER — ATORVASTATIN CALCIUM 80 MG/1
TABLET, FILM COATED ORAL
Qty: 30 TABLET | Refills: 5 | Status: SHIPPED | OUTPATIENT
Start: 2024-05-20

## 2024-05-20 RX ORDER — METOPROLOL SUCCINATE 25 MG/1
12.5 TABLET, EXTENDED RELEASE ORAL DAILY
Qty: 15 TABLET | Refills: 5 | Status: SHIPPED | OUTPATIENT
Start: 2024-05-20

## 2024-05-20 RX ORDER — LISINOPRIL 2.5 MG/1
1.25 TABLET ORAL DAILY
Qty: 15 TABLET | Refills: 5 | Status: SHIPPED | OUTPATIENT
Start: 2024-05-20

## 2024-05-20 NOTE — PROGRESS NOTES
at [phone].  BP goal 130/80 or less.  Call the office with any problems, questions or concerns at [phone].    Cardiology follow up as scheduled in Epic appointments.  Educational included in patient instructions.  Heart health.      Lizbet Yanez, APRN

## 2024-05-20 NOTE — PATIENT INSTRUCTIONS
increase the risk of heart disease.  Your doctor has found that you have a chance of having heart disease. You can do lots of things to keep your heart healthy. It may not be easy, but you can change your diet, exercise more, and quit smoking. These steps really work to lower your chance of heart disease.  Follow-up care is a key part of your treatment and safety. Be sure to make and go to all appointments, and call your doctor if you are having problems. It's also a good idea to know your test results and keep a list of the medicines you take.  How can you care for yourself at home?  Diet  Use less salt when you cook and eat. This helps lower your blood pressure. Taste food before salting. Add only a little salt when you think you need it. With time, your taste buds will adjust to less salt.  Eat fewer snack items, fast foods, canned soups, and other high-salt, high-fat, processed foods.  Read food labels and try to avoid saturated and trans fats. They increase your risk of heart disease by raising cholesterol levels.  Limit the amount of solid fat-butter, margarine, and shortening-you eat. Use olive, peanut, or canola oil when you cook. Bake, broil, and steam foods instead of frying them.  Eat a variety of fruit and vegetables every day. Dark green, deep orange, red, or yellow fruits and vegetables are especially good for you. Examples include spinach, carrots, peaches, and berries.  Foods high in fiber can reduce your cholesterol and provide important vitamins and minerals. High-fiber foods include whole-grain cereals and breads, oatmeal, beans, brown rice, citrus fruits, and apples.  Eat lean proteins. Heart-healthy proteins include seafood, lean meats and poultry, eggs, beans, peas, nuts, seeds, and soy products.  Limit drinks and foods with added sugar. These include candy, desserts, and soda pop.  Lifestyle changes  If your doctor recommends it, get more exercise. Walking is a good choice. Bit by bit,

## 2024-05-29 ENCOUNTER — HOSPITAL ENCOUNTER (OUTPATIENT)
Dept: NUCLEAR MEDICINE | Age: 69
Discharge: HOME | End: 2024-05-31
Payer: MEDICARE

## 2024-05-29 DIAGNOSIS — R07.9 CHEST PAIN, UNSPECIFIED TYPE: ICD-10-CM

## 2024-05-29 DIAGNOSIS — I25.10 CORONARY ARTERY DISEASE INVOLVING NATIVE CORONARY ARTERY OF NATIVE HEART, UNSPECIFIED WHETHER ANGINA PRESENT: ICD-10-CM

## 2024-05-29 LAB
NUC STRESS EJECTION FRACTION: 77 %
STRESS BASELINE DIAS BP: 90 MMHG
STRESS BASELINE HR: 67 BPM
STRESS BASELINE SYS BP: 149 MMHG
STRESS PEAK DIAS BP: 90 MMHG
STRESS PEAK SYS BP: 149 MMHG
STRESS PERCENT HR ACHIEVED: 65 %
STRESS POST PEAK HR: 100 BPM
STRESS RATE PRESSURE PRODUCT: NORMAL BPM*MMHG
STRESS STAGE 1 BP: NORMAL MMHG
STRESS STAGE 1 DURATION: 1 MIN:SEC
STRESS STAGE 1 HR: 68 BPM
STRESS STAGE 2 BP: NORMAL MMHG
STRESS STAGE 2 DURATION: 1 MIN:SEC
STRESS STAGE 2 HR: 96 BPM
STRESS STAGE 3 BP: NORMAL MMHG
STRESS STAGE 3 DURATION: 1 MIN:SEC
STRESS STAGE 3 HR: 99 BPM
STRESS STAGE 4 BP: NORMAL MMHG
STRESS STAGE 4 DURATION: 1 MIN:SEC
STRESS STAGE 4 HR: 98 BPM
STRESS STAGE 5 BP: NORMAL MMHG
STRESS STAGE 5 DURATION: 1 MIN:SEC
STRESS STAGE 5 HR: 92 BPM
STRESS STAGE RECOVERY 1 BP: NORMAL MMHG
STRESS STAGE RECOVERY 1 HR: 90 BPM
STRESS TARGET HR: 153 BPM

## 2024-05-29 PROCEDURE — 6360000002 HC RX W HCPCS: Performed by: NURSE PRACTITIONER

## 2024-05-29 PROCEDURE — 93017 CV STRESS TEST TRACING ONLY: CPT

## 2024-05-29 PROCEDURE — 78452 HT MUSCLE IMAGE SPECT MULT: CPT

## 2024-05-29 PROCEDURE — A9502 TC99M TETROFOSMIN: HCPCS | Performed by: NURSE PRACTITIONER

## 2024-05-29 PROCEDURE — 3430000000 HC RX DIAGNOSTIC RADIOPHARMACEUTICAL: Performed by: NURSE PRACTITIONER

## 2024-05-29 RX ORDER — REGADENOSON 0.08 MG/ML
0.4 INJECTION, SOLUTION INTRAVENOUS
Status: COMPLETED | OUTPATIENT
Start: 2024-05-29 | End: 2024-05-29

## 2024-05-29 RX ADMIN — REGADENOSON 0.4 MG: 0.08 INJECTION, SOLUTION INTRAVENOUS at 10:30

## 2024-05-29 RX ADMIN — TETROFOSMIN 24 MILLICURIE: 0.23 INJECTION, POWDER, LYOPHILIZED, FOR SOLUTION INTRAVENOUS at 11:39

## 2024-05-29 RX ADMIN — TETROFOSMIN 8 MILLICURIE: 0.23 INJECTION, POWDER, LYOPHILIZED, FOR SOLUTION INTRAVENOUS at 11:39

## 2024-12-05 ENCOUNTER — TELEPHONE (OUTPATIENT)
Dept: CARDIOLOGY CLINIC | Age: 69
End: 2024-12-05

## 2024-12-05 NOTE — TELEPHONE ENCOUNTER
Called Pt to r/s. Provider out of office. No answer/LVM. Please schedule first available for Lizbet Yanez. 12/5/24 jarvis

## 2024-12-06 DIAGNOSIS — E78.2 MIXED HYPERLIPIDEMIA: ICD-10-CM

## 2024-12-06 DIAGNOSIS — I10 ESSENTIAL HYPERTENSION: ICD-10-CM

## 2024-12-06 RX ORDER — METOPROLOL SUCCINATE 25 MG/1
12.5 TABLET, EXTENDED RELEASE ORAL DAILY
Qty: 15 TABLET | Refills: 5 | Status: SHIPPED | OUTPATIENT
Start: 2024-12-06

## 2024-12-06 RX ORDER — CLOPIDOGREL BISULFATE 75 MG/1
75 TABLET ORAL DAILY
Qty: 90 TABLET | Refills: 3 | Status: SHIPPED | OUTPATIENT
Start: 2024-12-06

## 2024-12-06 RX ORDER — ATORVASTATIN CALCIUM 80 MG/1
TABLET, FILM COATED ORAL
Qty: 90 TABLET | Refills: 3 | Status: SHIPPED | OUTPATIENT
Start: 2024-12-06

## 2024-12-06 RX ORDER — ISOSORBIDE MONONITRATE 30 MG/1
30 TABLET, EXTENDED RELEASE ORAL NIGHTLY
Qty: 90 TABLET | Refills: 3 | Status: SHIPPED | OUTPATIENT
Start: 2024-12-06

## 2024-12-06 RX ORDER — LISINOPRIL 2.5 MG/1
1.25 TABLET ORAL DAILY
Qty: 15 TABLET | Refills: 5 | Status: SHIPPED | OUTPATIENT
Start: 2024-12-06

## 2025-03-31 ENCOUNTER — OFFICE VISIT (OUTPATIENT)
Dept: CARDIOLOGY CLINIC | Age: 70
End: 2025-03-31
Payer: MEDICARE

## 2025-03-31 VITALS
HEIGHT: 64 IN | BODY MASS INDEX: 34.31 KG/M2 | SYSTOLIC BLOOD PRESSURE: 138 MMHG | HEART RATE: 89 BPM | WEIGHT: 201 LBS | OXYGEN SATURATION: 98 % | DIASTOLIC BLOOD PRESSURE: 90 MMHG

## 2025-03-31 DIAGNOSIS — I10 ESSENTIAL HYPERTENSION: ICD-10-CM

## 2025-03-31 DIAGNOSIS — E78.2 MIXED HYPERLIPIDEMIA: ICD-10-CM

## 2025-03-31 DIAGNOSIS — Z95.5 HISTORY OF CORONARY ARTERY STENT PLACEMENT: ICD-10-CM

## 2025-03-31 DIAGNOSIS — I25.2 HISTORY OF MYOCARDIAL INFARCTION: ICD-10-CM

## 2025-03-31 DIAGNOSIS — I25.10 CORONARY ARTERY DISEASE INVOLVING NATIVE HEART, UNSPECIFIED VESSEL OR LESION TYPE, UNSPECIFIED WHETHER ANGINA PRESENT: Primary | ICD-10-CM

## 2025-03-31 PROCEDURE — 3080F DIAST BP >= 90 MM HG: CPT | Performed by: NURSE PRACTITIONER

## 2025-03-31 PROCEDURE — G8427 DOCREV CUR MEDS BY ELIG CLIN: HCPCS | Performed by: NURSE PRACTITIONER

## 2025-03-31 PROCEDURE — 1159F MED LIST DOCD IN RCRD: CPT | Performed by: NURSE PRACTITIONER

## 2025-03-31 PROCEDURE — 1036F TOBACCO NON-USER: CPT | Performed by: NURSE PRACTITIONER

## 2025-03-31 PROCEDURE — 3017F COLORECTAL CA SCREEN DOC REV: CPT | Performed by: NURSE PRACTITIONER

## 2025-03-31 PROCEDURE — 99214 OFFICE O/P EST MOD 30 MIN: CPT | Performed by: NURSE PRACTITIONER

## 2025-03-31 PROCEDURE — 1090F PRES/ABSN URINE INCON ASSESS: CPT | Performed by: NURSE PRACTITIONER

## 2025-03-31 PROCEDURE — G8400 PT W/DXA NO RESULTS DOC: HCPCS | Performed by: NURSE PRACTITIONER

## 2025-03-31 PROCEDURE — 3075F SYST BP GE 130 - 139MM HG: CPT | Performed by: NURSE PRACTITIONER

## 2025-03-31 PROCEDURE — G8417 CALC BMI ABV UP PARAM F/U: HCPCS | Performed by: NURSE PRACTITIONER

## 2025-03-31 PROCEDURE — 1123F ACP DISCUSS/DSCN MKR DOCD: CPT | Performed by: NURSE PRACTITIONER

## 2025-03-31 PROCEDURE — 1160F RVW MEDS BY RX/DR IN RCRD: CPT | Performed by: NURSE PRACTITIONER

## 2025-03-31 RX ORDER — LISINOPRIL 2.5 MG/1
1.25 TABLET ORAL DAILY
Qty: 15 TABLET | Refills: 5 | Status: SHIPPED | OUTPATIENT
Start: 2025-03-31

## 2025-03-31 RX ORDER — ISOSORBIDE MONONITRATE 30 MG/1
30 TABLET, EXTENDED RELEASE ORAL NIGHTLY
Qty: 30 TABLET | Refills: 5 | Status: SHIPPED | OUTPATIENT
Start: 2025-03-31

## 2025-03-31 RX ORDER — ATORVASTATIN CALCIUM 80 MG/1
TABLET, FILM COATED ORAL
Qty: 30 TABLET | Refills: 5 | Status: SHIPPED | OUTPATIENT
Start: 2025-03-31

## 2025-03-31 RX ORDER — METOPROLOL SUCCINATE 25 MG/1
12.5 TABLET, EXTENDED RELEASE ORAL DAILY
Qty: 15 TABLET | Refills: 5 | Status: SHIPPED | OUTPATIENT
Start: 2025-03-31

## 2025-03-31 RX ORDER — CLOPIDOGREL BISULFATE 75 MG/1
75 TABLET ORAL DAILY
Qty: 30 TABLET | Refills: 5 | Status: SHIPPED | OUTPATIENT
Start: 2025-03-31

## 2025-03-31 RX ORDER — NITROGLYCERIN 0.4 MG/1
0.4 TABLET SUBLINGUAL EVERY 5 MIN PRN
Qty: 25 TABLET | Refills: 3 | Status: SHIPPED | OUTPATIENT
Start: 2025-03-31

## 2025-03-31 NOTE — PROGRESS NOTES
unspecified vessel or lesion type, unspecified whether angina present        2. Essential hypertension        3. Mixed hyperlipidemia        4. History of coronary artery stent placement        5. History of myocardial infarction          Stable CV status without overt heart failure, sensed arrhythmia or angina.      CAD - stable on current medical management.  Continue plan.    HTN - BP marginally elevated today at 138/90.  Home BP monitoring.  Continues taking Lisinopril and Toprol XL.  Goal less than 130/80.  BP Readings from Last 3 Encounters:   03/31/25 (!) 138/90   05/20/24 128/78   10/09/23 124/82      Patient is compliant with medication regimen.    Previous cardiac history and records reviewed.  Continue current medical management for cardiac related condition.  Continue other current medications as directed.  Continue to follow up with primary care provider for non cardiac medical problems.  If your primary care provider is outside of the [facility], please request that your labs be faxed to this office at [phone].  BP goal 130/80 or less.  Call the office with any problems, questions or concerns at [phone].    Cardiology follow up as scheduled in Epic appointments.  Educational included in patient instructions.  Heart health.      TOMAS Haley

## 2025-03-31 NOTE — PATIENT INSTRUCTIONS
New instructions for today:  How to take:  NITROGLYCERIN (Nitrostat) 0.4 mg tablets, sublingual.  Nitroglycerin is in a group of drugs called nitrates. Nitroglycerin dilates (widens) blood vessels, making it easier for blood to flow through them and easier for the heart to pump.    Dosing Guidelines for Nitroglycerin Tablets  At the start of an angina (chest pain) attack, place one tablet under the tongue or between the cheek and gum.  Do not swallow or chew the tablet; let it dissolve on its own.  If necessary, a second and third tablet may be used, with five minutes between using each tablet.  If you use a third tablet and your chest pain continues, it is time to seek immediate medical attention.  Call 911 immediately and have someone drive you to the emergency room.  You may be having a heart attack or other serious heart problem.    To prevent angina from exercise or stress, use 1 tablet 5 to 10 minutes before the activity.      Patient Instructions:  Continue current medications as prescribed.   Always keep a current medication list. Bring your medications to every office visit.   Continue to follow up with primary care provider for non cardiac medical problems.  Call the office with any problems, questions or concerns at [phone].  If you have been asked to keep a blood pressure log, do so for 2 weeks. Call the office to report readings to the triage nurse at [phone].  Follow up with cardiologist as scheduled.  The following educational material has been included in this after visit summary for your review: Life simple 7.  Heart health.     Life simple 7  1) Manage blood pressure - high blood pressure is a major risk factor for heart disease and stroke. Keeping blood pressure in health range reduces strain on your heart, arteries and kidneys.  Blood pressure goal is less than 130/80.   2) Control cholesterol - contributes to plaque, which can clog arteries and lead to heart disease and stroke. When